# Patient Record
Sex: FEMALE | Race: ASIAN | NOT HISPANIC OR LATINO | Employment: PART TIME
[De-identification: names, ages, dates, MRNs, and addresses within clinical notes are randomized per-mention and may not be internally consistent; named-entity substitution may affect disease eponyms.]

---

## 2017-04-15 ENCOUNTER — RECORDS - HEALTHEAST (OUTPATIENT)
Dept: ADMINISTRATIVE | Facility: OTHER | Age: 62
End: 2017-04-15

## 2017-04-18 ENCOUNTER — OFFICE VISIT - HEALTHEAST (OUTPATIENT)
Dept: FAMILY MEDICINE | Facility: CLINIC | Age: 62
End: 2017-04-18

## 2017-04-18 DIAGNOSIS — M62.830 BACK SPASM: ICD-10-CM

## 2017-04-18 DIAGNOSIS — E78.5 HYPERLIPIDEMIA: ICD-10-CM

## 2017-04-18 DIAGNOSIS — N39.0 UTI (URINARY TRACT INFECTION): ICD-10-CM

## 2017-04-21 ENCOUNTER — RECORDS - HEALTHEAST (OUTPATIENT)
Dept: ADMINISTRATIVE | Facility: OTHER | Age: 62
End: 2017-04-21

## 2017-04-24 ENCOUNTER — OFFICE VISIT - HEALTHEAST (OUTPATIENT)
Dept: FAMILY MEDICINE | Facility: CLINIC | Age: 62
End: 2017-04-24

## 2017-04-24 DIAGNOSIS — J40 BRONCHITIS: ICD-10-CM

## 2017-04-24 DIAGNOSIS — I10 ESSENTIAL HYPERTENSION WITH GOAL BLOOD PRESSURE LESS THAN 140/90: ICD-10-CM

## 2017-04-24 DIAGNOSIS — E78.5 HYPERLIPIDEMIA: ICD-10-CM

## 2017-04-24 DIAGNOSIS — H40.9: ICD-10-CM

## 2017-06-30 ENCOUNTER — COMMUNICATION - HEALTHEAST (OUTPATIENT)
Dept: ADMINISTRATIVE | Facility: CLINIC | Age: 62
End: 2017-06-30

## 2017-12-06 ENCOUNTER — OFFICE VISIT - HEALTHEAST (OUTPATIENT)
Dept: FAMILY MEDICINE | Facility: CLINIC | Age: 62
End: 2017-12-06

## 2017-12-06 DIAGNOSIS — B96.89 BACTERIAL SINUSITIS: ICD-10-CM

## 2017-12-06 DIAGNOSIS — R09.81 SINUS CONGESTION: ICD-10-CM

## 2017-12-06 DIAGNOSIS — J32.9 BACTERIAL SINUSITIS: ICD-10-CM

## 2017-12-06 DIAGNOSIS — R05.9 COUGH: ICD-10-CM

## 2017-12-06 DIAGNOSIS — E66.3 OVERWEIGHT (BMI 25.0-29.9): ICD-10-CM

## 2017-12-06 ASSESSMENT — MIFFLIN-ST. JEOR: SCORE: 1019.89

## 2018-01-22 ENCOUNTER — OFFICE VISIT - HEALTHEAST (OUTPATIENT)
Dept: FAMILY MEDICINE | Facility: CLINIC | Age: 63
End: 2018-01-22

## 2018-01-22 DIAGNOSIS — R73.9 HYPERGLYCEMIA: ICD-10-CM

## 2018-01-22 DIAGNOSIS — E78.5 HYPERLIPIDEMIA, UNSPECIFIED HYPERLIPIDEMIA TYPE: ICD-10-CM

## 2018-01-22 DIAGNOSIS — I10 ESSENTIAL HYPERTENSION: ICD-10-CM

## 2018-01-22 DIAGNOSIS — R35.0 URINARY FREQUENCY: ICD-10-CM

## 2018-01-22 LAB
ALBUMIN UR-MCNC: NEGATIVE MG/DL
APPEARANCE UR: CLEAR
BILIRUB UR QL STRIP: NEGATIVE
COLOR UR AUTO: YELLOW
GLUCOSE UR STRIP-MCNC: NEGATIVE MG/DL
HBA1C MFR BLD: 6.2 % (ref 3.5–6)
HGB UR QL STRIP: NEGATIVE
KETONES UR STRIP-MCNC: NEGATIVE MG/DL
LEUKOCYTE ESTERASE UR QL STRIP: ABNORMAL
NITRATE UR QL: NEGATIVE
PH UR STRIP: 6.5 [PH] (ref 5–8)
SP GR UR STRIP: 1.02 (ref 1–1.03)
UROBILINOGEN UR STRIP-ACNC: ABNORMAL

## 2018-01-23 LAB
ANION GAP SERPL CALCULATED.3IONS-SCNC: 6 MMOL/L (ref 5–18)
BUN SERPL-MCNC: 18 MG/DL (ref 8–22)
CALCIUM SERPL-MCNC: 9.4 MG/DL (ref 8.5–10.5)
CHLORIDE BLD-SCNC: 104 MMOL/L (ref 98–107)
CHOLEST SERPL-MCNC: 269 MG/DL
CO2 SERPL-SCNC: 29 MMOL/L (ref 22–31)
CREAT SERPL-MCNC: 0.72 MG/DL (ref 0.6–1.1)
FASTING STATUS PATIENT QL REPORTED: YES
GFR SERPL CREATININE-BSD FRML MDRD: >60 ML/MIN/1.73M2
GLUCOSE BLD-MCNC: 107 MG/DL (ref 70–125)
HDLC SERPL-MCNC: 86 MG/DL
LDLC SERPL CALC-MCNC: 153 MG/DL
POTASSIUM BLD-SCNC: 4.1 MMOL/L (ref 3.5–5)
SODIUM SERPL-SCNC: 139 MMOL/L (ref 136–145)
TRIGL SERPL-MCNC: 152 MG/DL

## 2018-01-24 LAB — BACTERIA SPEC CULT: NO GROWTH

## 2018-01-25 ENCOUNTER — COMMUNICATION - HEALTHEAST (OUTPATIENT)
Dept: FAMILY MEDICINE | Facility: CLINIC | Age: 63
End: 2018-01-25

## 2018-08-14 ENCOUNTER — OFFICE VISIT - HEALTHEAST (OUTPATIENT)
Dept: FAMILY MEDICINE | Facility: CLINIC | Age: 63
End: 2018-08-14
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
## 2018-08-14 ASSESSMENT — MIFFLIN-ST. JEOR: SCORE: 1046.26

## 2019-04-26 ENCOUNTER — OFFICE VISIT - HEALTHEAST (OUTPATIENT)
Dept: FAMILY MEDICINE | Facility: CLINIC | Age: 64
End: 2019-04-26

## 2019-04-26 DIAGNOSIS — E78.5 HYPERLIPIDEMIA, UNSPECIFIED HYPERLIPIDEMIA TYPE: ICD-10-CM

## 2019-04-26 DIAGNOSIS — R06.02 SOB (SHORTNESS OF BREATH) ON EXERTION: ICD-10-CM

## 2019-04-26 DIAGNOSIS — R07.89 ATYPICAL CHEST PAIN: ICD-10-CM

## 2019-04-26 DIAGNOSIS — Z79.2 PROPHYLACTIC ANTIBIOTIC: ICD-10-CM

## 2019-04-26 DIAGNOSIS — Z12.11 SCREEN FOR COLON CANCER: ICD-10-CM

## 2019-04-26 DIAGNOSIS — B00.9 HERPES SIMPLEX: ICD-10-CM

## 2019-04-26 DIAGNOSIS — N95.2 ATROPHIC VAGINITIS: ICD-10-CM

## 2019-04-26 DIAGNOSIS — J18.9 PNEUMONIA DUE TO INFECTIOUS ORGANISM, UNSPECIFIED LATERALITY, UNSPECIFIED PART OF LUNG: ICD-10-CM

## 2019-04-26 DIAGNOSIS — R73.03 PREDIABETES: ICD-10-CM

## 2019-04-26 DIAGNOSIS — R05.9 COUGH: ICD-10-CM

## 2019-04-26 DIAGNOSIS — Z12.31 VISIT FOR SCREENING MAMMOGRAM: ICD-10-CM

## 2019-04-26 LAB
ALBUMIN SERPL-MCNC: 4.2 G/DL (ref 3.5–5)
ALP SERPL-CCNC: 73 U/L (ref 45–120)
ALT SERPL W P-5'-P-CCNC: 26 U/L (ref 0–45)
ANION GAP SERPL CALCULATED.3IONS-SCNC: 8 MMOL/L (ref 5–18)
AST SERPL W P-5'-P-CCNC: 24 U/L (ref 0–40)
BASOPHILS # BLD AUTO: 0 THOU/UL (ref 0–0.2)
BASOPHILS NFR BLD AUTO: 1 % (ref 0–2)
BILIRUB SERPL-MCNC: 0.8 MG/DL (ref 0–1)
BUN SERPL-MCNC: 15 MG/DL (ref 8–22)
CALCIUM SERPL-MCNC: 10 MG/DL (ref 8.5–10.5)
CHLORIDE BLD-SCNC: 103 MMOL/L (ref 98–107)
CHOLEST SERPL-MCNC: 251 MG/DL
CO2 SERPL-SCNC: 30 MMOL/L (ref 22–31)
CREAT SERPL-MCNC: 0.75 MG/DL (ref 0.6–1.1)
EOSINOPHIL # BLD AUTO: 0.1 THOU/UL (ref 0–0.4)
EOSINOPHIL NFR BLD AUTO: 3 % (ref 0–6)
ERYTHROCYTE [DISTWIDTH] IN BLOOD BY AUTOMATED COUNT: 12 % (ref 11–14.5)
FASTING STATUS PATIENT QL REPORTED: ABNORMAL
GFR SERPL CREATININE-BSD FRML MDRD: >60 ML/MIN/1.73M2
GLUCOSE BLD-MCNC: 101 MG/DL (ref 70–125)
HBA1C MFR BLD: 6.2 % (ref 3.5–6)
HCT VFR BLD AUTO: 40.4 % (ref 35–47)
HDLC SERPL-MCNC: 85 MG/DL
HGB BLD-MCNC: 13.6 G/DL (ref 12–16)
LDLC SERPL CALC-MCNC: 137 MG/DL
LYMPHOCYTES # BLD AUTO: 2 THOU/UL (ref 0.8–4.4)
LYMPHOCYTES NFR BLD AUTO: 40 % (ref 20–40)
MCH RBC QN AUTO: 32.5 PG (ref 27–34)
MCHC RBC AUTO-ENTMCNC: 33.6 G/DL (ref 32–36)
MCV RBC AUTO: 97 FL (ref 80–100)
MONOCYTES # BLD AUTO: 0.3 THOU/UL (ref 0–0.9)
MONOCYTES NFR BLD AUTO: 6 % (ref 2–10)
NEUTROPHILS # BLD AUTO: 2.6 THOU/UL (ref 2–7.7)
NEUTROPHILS NFR BLD AUTO: 51 % (ref 50–70)
PLATELET # BLD AUTO: 377 THOU/UL (ref 140–440)
PMV BLD AUTO: 6.7 FL (ref 7–10)
POTASSIUM BLD-SCNC: 4 MMOL/L (ref 3.5–5)
PROT SERPL-MCNC: 7.4 G/DL (ref 6–8)
RBC # BLD AUTO: 4.17 MILL/UL (ref 3.8–5.4)
SODIUM SERPL-SCNC: 141 MMOL/L (ref 136–145)
TRIGL SERPL-MCNC: 147 MG/DL
WBC: 5.1 THOU/UL (ref 4–11)

## 2019-04-28 ENCOUNTER — COMMUNICATION - HEALTHEAST (OUTPATIENT)
Dept: FAMILY MEDICINE | Facility: CLINIC | Age: 64
End: 2019-04-28

## 2019-05-20 ENCOUNTER — COMMUNICATION - HEALTHEAST (OUTPATIENT)
Dept: ADMINISTRATIVE | Facility: CLINIC | Age: 64
End: 2019-05-20

## 2019-06-24 ENCOUNTER — COMMUNICATION - HEALTHEAST (OUTPATIENT)
Dept: FAMILY MEDICINE | Facility: CLINIC | Age: 64
End: 2019-06-24

## 2019-11-05 ENCOUNTER — RECORDS - HEALTHEAST (OUTPATIENT)
Dept: ADMINISTRATIVE | Facility: OTHER | Age: 64
End: 2019-11-05

## 2019-11-13 ENCOUNTER — RECORDS - HEALTHEAST (OUTPATIENT)
Dept: ADMINISTRATIVE | Facility: OTHER | Age: 64
End: 2019-11-13

## 2019-11-29 ENCOUNTER — OFFICE VISIT - HEALTHEAST (OUTPATIENT)
Dept: FAMILY MEDICINE | Facility: CLINIC | Age: 64
End: 2019-11-29

## 2019-11-29 DIAGNOSIS — J40 BRONCHITIS: ICD-10-CM

## 2020-01-09 ENCOUNTER — OFFICE VISIT - HEALTHEAST (OUTPATIENT)
Dept: FAMILY MEDICINE | Facility: CLINIC | Age: 65
End: 2020-01-09

## 2020-01-09 ENCOUNTER — COMMUNICATION - HEALTHEAST (OUTPATIENT)
Dept: TELEHEALTH | Facility: CLINIC | Age: 65
End: 2020-01-09

## 2020-01-09 DIAGNOSIS — J40 BRONCHITIS: ICD-10-CM

## 2020-01-09 DIAGNOSIS — R05.9 COUGH: ICD-10-CM

## 2020-01-09 ASSESSMENT — MIFFLIN-ST. JEOR: SCORE: 1044.39

## 2020-01-13 ENCOUNTER — OFFICE VISIT - HEALTHEAST (OUTPATIENT)
Dept: FAMILY MEDICINE | Facility: CLINIC | Age: 65
End: 2020-01-13

## 2020-01-13 DIAGNOSIS — J06.9 UPPER RESPIRATORY TRACT INFECTION, UNSPECIFIED TYPE: ICD-10-CM

## 2020-01-13 DIAGNOSIS — J11.1 INFLUENZA: ICD-10-CM

## 2020-02-27 ENCOUNTER — OFFICE VISIT - HEALTHEAST (OUTPATIENT)
Dept: FAMILY MEDICINE | Facility: CLINIC | Age: 65
End: 2020-02-27

## 2020-02-27 DIAGNOSIS — M79.671 RIGHT FOOT PAIN: ICD-10-CM

## 2020-03-04 ENCOUNTER — OFFICE VISIT - HEALTHEAST (OUTPATIENT)
Dept: FAMILY MEDICINE | Facility: CLINIC | Age: 65
End: 2020-03-04

## 2020-03-04 DIAGNOSIS — J11.1 INFLUENZA: ICD-10-CM

## 2020-09-02 ENCOUNTER — AMBULATORY - HEALTHEAST (OUTPATIENT)
Dept: NURSING | Facility: CLINIC | Age: 65
End: 2020-09-02

## 2020-09-02 DIAGNOSIS — S81.819A LACERATION OF LOWER LEG: ICD-10-CM

## 2021-03-17 ENCOUNTER — AMBULATORY - HEALTHEAST (OUTPATIENT)
Dept: NURSING | Facility: CLINIC | Age: 66
End: 2021-03-17

## 2021-04-07 ENCOUNTER — AMBULATORY - HEALTHEAST (OUTPATIENT)
Dept: NURSING | Facility: CLINIC | Age: 66
End: 2021-04-07

## 2021-05-24 ENCOUNTER — HOSPITAL ENCOUNTER (EMERGENCY)
Dept: EMERGENCY MEDICINE | Facility: CLINIC | Age: 66
Discharge: HOME | End: 2021-05-24
Attending: EMERGENCY MEDICINE
Payer: COMMERCIAL

## 2021-05-24 ENCOUNTER — COMMUNICATION - HEALTHEAST (OUTPATIENT)
Dept: UROLOGY | Facility: CLINIC | Age: 66
End: 2021-05-24

## 2021-05-24 DIAGNOSIS — N20.0 NEPHROLITHIASIS: ICD-10-CM

## 2021-05-24 DIAGNOSIS — K59.00 CONSTIPATION, UNSPECIFIED CONSTIPATION TYPE: ICD-10-CM

## 2021-05-24 DIAGNOSIS — N20.1 URETEROLITHIASIS: ICD-10-CM

## 2021-05-24 LAB
ALBUMIN SERPL-MCNC: 3.9 G/DL (ref 3.5–5)
ALP SERPL-CCNC: 80 U/L (ref 45–120)
ALT SERPL W P-5'-P-CCNC: 28 U/L (ref 0–45)
ANION GAP SERPL CALCULATED.3IONS-SCNC: 13 MMOL/L (ref 5–18)
AST SERPL W P-5'-P-CCNC: 20 U/L (ref 0–40)
BASOPHILS # BLD AUTO: 0 THOU/UL (ref 0–0.2)
BASOPHILS NFR BLD AUTO: 0 % (ref 0–2)
BILIRUB DIRECT SERPL-MCNC: 0.2 MG/DL
BILIRUB SERPL-MCNC: 0.9 MG/DL (ref 0–1)
BUN SERPL-MCNC: 16 MG/DL (ref 8–22)
CALCIUM SERPL-MCNC: 9.2 MG/DL (ref 8.5–10.5)
CHLORIDE BLD-SCNC: 100 MMOL/L (ref 98–107)
CO2 SERPL-SCNC: 22 MMOL/L (ref 22–31)
CREAT SERPL-MCNC: 1.27 MG/DL (ref 0.6–1.1)
EOSINOPHIL # BLD AUTO: 0 THOU/UL (ref 0–0.4)
EOSINOPHIL NFR BLD AUTO: 0 % (ref 0–6)
ERYTHROCYTE [DISTWIDTH] IN BLOOD BY AUTOMATED COUNT: 11.5 % (ref 11–14.5)
GFR SERPL CREATININE-BSD FRML MDRD: 42 ML/MIN/1.73M2
GLUCOSE BLD-MCNC: 165 MG/DL (ref 70–125)
HCT VFR BLD AUTO: 40.1 % (ref 35–47)
HGB BLD-MCNC: 13.9 G/DL (ref 12–16)
IMM GRANULOCYTES # BLD: 0.1 THOU/UL
IMM GRANULOCYTES NFR BLD: 1 %
LACTATE SERPL-SCNC: 1.8 MMOL/L (ref 0.7–2)
LACTATE SERPL-SCNC: 2.9 MMOL/L (ref 0.7–2)
LIPASE SERPL-CCNC: <9 U/L (ref 0–52)
LYMPHOCYTES # BLD AUTO: 1.3 THOU/UL (ref 0.8–4.4)
LYMPHOCYTES NFR BLD AUTO: 11 % (ref 20–40)
MCH RBC QN AUTO: 31.7 PG (ref 27–34)
MCHC RBC AUTO-ENTMCNC: 34.7 G/DL (ref 32–36)
MCV RBC AUTO: 91 FL (ref 80–100)
MONOCYTES # BLD AUTO: 0.8 THOU/UL (ref 0–0.9)
MONOCYTES NFR BLD AUTO: 6 % (ref 2–10)
NEUTROPHILS # BLD AUTO: 9.9 THOU/UL (ref 2–7.7)
NEUTROPHILS NFR BLD AUTO: 83 % (ref 50–70)
PLATELET # BLD AUTO: 327 THOU/UL (ref 140–440)
PMV BLD AUTO: 8.5 FL (ref 8.5–12.5)
POTASSIUM BLD-SCNC: 3.9 MMOL/L (ref 3.5–5)
PROT SERPL-MCNC: 7.8 G/DL (ref 6–8)
RBC # BLD AUTO: 4.39 MILL/UL (ref 3.8–5.4)
SODIUM SERPL-SCNC: 135 MMOL/L (ref 136–145)
WBC: 11.9 THOU/UL (ref 4–11)

## 2021-05-25 ENCOUNTER — OFFICE VISIT - HEALTHEAST (OUTPATIENT)
Dept: UROLOGY | Facility: CLINIC | Age: 66
End: 2021-05-25

## 2021-05-25 ENCOUNTER — RECORDS - HEALTHEAST (OUTPATIENT)
Dept: UROLOGY | Facility: CLINIC | Age: 66
End: 2021-05-25

## 2021-05-25 DIAGNOSIS — N13.2 HYDRONEPHROSIS WITH URINARY OBSTRUCTION DUE TO URETERAL CALCULUS: ICD-10-CM

## 2021-05-25 DIAGNOSIS — N20.0 CALCULUS OF KIDNEY: ICD-10-CM

## 2021-05-25 DIAGNOSIS — N20.1 CALCULUS OF URETER: ICD-10-CM

## 2021-05-25 LAB — BACTERIA SPEC CULT: NO GROWTH

## 2021-05-27 ENCOUNTER — COMMUNICATION - HEALTHEAST (OUTPATIENT)
Dept: UROLOGY | Facility: CLINIC | Age: 66
End: 2021-05-27

## 2021-05-28 ENCOUNTER — COMMUNICATION - HEALTHEAST (OUTPATIENT)
Dept: UROLOGY | Facility: CLINIC | Age: 66
End: 2021-05-28

## 2021-05-28 DIAGNOSIS — N20.1 URETEROLITHIASIS: ICD-10-CM

## 2021-05-28 NOTE — PROGRESS NOTES
SUBJECTIVE: Gala Nicholas is a 63 y.o.  female who presents today with a complaint of 3 weeks of cough and chest tightness.  She said mostly it is a croupy dry cough.  At times she gets dizzy with it.  She is not congested as she has been using a [place] pot.  She does not think it feels like bronchitis.  Last Tuesday she started with some nausea and vomiting.  She had headache and body aches and wheeze.  She also admits that she has had intermittent chest pressure/tightness which is worse with exertion in the last 2 weeks.  This is worrisome as when I saw her in late January and she had lab work done, it showed that she has pretty high cholesterol at 269 with an LDL of 153.  She also was found to be prediabetic with an A1c of 6.2%.  She has family history for this as her mom has diabetes even though she is not overweight in the least.  We discussed whether she should consider doing and a cardiac stress test.  We discussed that this should probably not be done until she is feeling better as she will not be able to fulfill it the way it should be done.  I suggested doing a chest x-ray today and some lab work including a hemogram that we can follow if need be.  She also is in need of her colonoscopy and mammogram which she is somewhat resistant to doing.  She does want refills of some of her medications.    OBJECTIVE: /82 (Patient Site: Left Arm, Patient Position: Sitting, Cuff Size: Adult Regular)   Pulse 73   Temp 98.2  F (36.8  C) (Oral)   Wt 129 lb 11.2 oz (58.8 kg)   LMP  (Exact Date)   SpO2 99%   BMI 26.20 kg/m    General: Relatively well-appearing older  female with intermittent somewhat harsh nonproductive cough  HEENT: Eyes clear, nose without rhinorrhea  Heart: Regular rate and rhythm without murmur  Lungs: Left lung noise that cleared after cough, upper airway noise  Abdomen: Soft  Extremities: Warm, dry and without edema    ASSESSMENT & PLAN:    1. Cough     2. Pneumonia due to  infectious organism, unspecified laterality, unspecified part of lung  azithromycin (ZITHROMAX Z-CARLYN) 250 MG tablet   3. SOB (shortness of breath) on exertion  XR Chest 2 Views    HM1(CBC and Differential)    HM1 (CBC with Diff)   4. Atypical chest pain  XR Chest 2 Views    NM Exercise Stress Test   5. Hyperlipidemia, unspecified hyperlipidemia type  Lipid Cascade    Comprehensive Metabolic Panel   6. Prediabetes  Glycosylated Hemoglobin A1c   7. Herpes simplex  valACYclovir (VALTREX) 500 MG tablet    acyclovir (ZOVIRAX) 5 % ointment    DISCONTINUED: acyclovir (ZOVIRAX) 5 % ointment   8. Atrophic vaginitis  estradiol (ESTRACE) 0.01 % (0.1 mg/gram) vaginal cream    DISCONTINUED: estradiol (ESTRACE) 0.01 % (0.1 mg/gram) vaginal cream   9. Prophylactic antibiotic  nitrofurantoin (MACRODANTIN) 50 MG capsule   10. Visit for screening mammogram  Mammo Screening Bilateral   11. Screen for colon cancer  Ambulatory referral for Colonoscopy     I will do a chest x-ray and lab work as above and I will get back to her on those results by mail and only call with grossly abnormal values and I will call with chest x-ray result and hemogram.  I think it is reasonable to treat her with an antibiotic as she has had this cough for 3 weeks.  I will treat her with a Z-Carlyn.  I am refilling her cold sore medication, hormone replacement cream and nitrofurantoin which she uses postcoitally.  And mammogram and colonoscopy were ordered.  If the chest x-ray shows pneumonia she will need to follow-up in a week or 2.  I have ordered a nuclear medicine exercise stress test for her symptoms of chest tightness, worse with exertion.  I do believe she is at risk for coronary artery disease with her risk factors.  I want her to do the stress test out 2 weeks from now at minimum.  If the stress test is normal and if she gets better with treatment and does not appear to have lobar pneumonia on chest x-ray then she can see me back in 6 months  time.    Patient Active Problem List   Diagnosis     Hand arthritis     Hyperlipidemia     Murmur, cardiac     Genital warts     Herpes simplex     Postcoital urinary tract infection     Impaired fasting glucose     Glaucoma     Essential hypertension     Prediabetes       Current Outpatient Medications on File Prior to Visit   Medication Sig Dispense Refill     aspirin 325 MG EC tablet Take 325 mg by mouth as needed.              aspirin 81 MG EC tablet Take 81 mg by mouth daily.       BIOTIN ORAL Take 1 tablet by mouth daily.              cholecalciferol, vitamin D3, 5,000 unit Tab Take 5,000 Units by mouth as needed.              EVENING PRIMROSE OIL ORAL Take 1 tablet by mouth 2 (two) times a day.              FA/mv,Ca,iron,min/lycopene/lut (MULTIVITAL ORAL) Take 1 tablet by mouth daily.       ibuprofen (ADVIL,MOTRIN) 200 MG tablet Take 200 mg by mouth every 6 (six) hours as needed for pain.       OMEGA-3/DHA/EPA/FISH OIL (FISH OIL-OMEGA-3 FATTY ACIDS) 300-1,000 mg capsule Take 2 g by mouth daily.       RED YEAST RICE ORAL Take 1 tablet by mouth as needed.       RESTASIS 0.05 % ophthalmic emulsion INSTILL 1 DROP INTO BOTH EYES TWICE A DAY  11     timolol (BETIMOL) 0.5 % ophthalmic solution 1 drop 2 (two) times a day.       UNABLE TO FIND Med Name: Amberen 1300 mg Take 2 capsules by mouth daily       benzonatate (TESSALON) 200 MG capsule Take 1 capsule (200 mg total) by mouth 3 (three) times a day as needed for cough. 30 capsule 0     No current facility-administered medications on file prior to visit.

## 2021-05-29 NOTE — TELEPHONE ENCOUNTER
I called Gala Nicholas to remind her that she is due for a mammogram. She said that she has had her previous mammograms done at [facility] in [place] and will call there to schedule an appointment.   She said that she will have the report and previous reports sent to the clinic.  Radha WHITT CMA

## 2021-05-30 VITALS — BODY MASS INDEX: 27.3 KG/M2 | WEIGHT: 132.9 LBS

## 2021-05-30 VITALS — WEIGHT: 131.1 LBS | BODY MASS INDEX: 26.93 KG/M2

## 2021-05-31 VITALS — WEIGHT: 130.5 LBS | BODY MASS INDEX: 27.27 KG/M2

## 2021-05-31 VITALS — WEIGHT: 129 LBS | HEIGHT: 58 IN | BODY MASS INDEX: 27.08 KG/M2

## 2021-06-01 VITALS — HEIGHT: 59 IN | WEIGHT: 131.31 LBS | BODY MASS INDEX: 26.47 KG/M2

## 2021-06-03 VITALS — WEIGHT: 129.7 LBS | BODY MASS INDEX: 26.2 KG/M2

## 2021-06-03 NOTE — PROGRESS NOTES
Chief Complaint   Patient presents with     Cough     Pt c/o sore throat, HA, runny nose, sinus congestion, coughed up bloody yellow phlegm, bronchioles feel burning, aches and chills       HPI: Very pleasant 64-year-old female with past history of hyperlipidemia and prediabetes who works for [employer], presents today with a 8-day history of cough, congestion, sinus pain.  She has a past history of facial pain as well as bronchitis.  What is new and different is she is coughing up green sputum with occasional slight specks of blood.  This is common for her when she gets bronchitis.  She has been using a [place] pot as well.  In addition, her cough is causing her great deal of difficulty sleeping and is quite annoying.  She would like medication for this.    ROS: No fever vomiting or diarrhea or rashes    SH:    reports that she quit smoking about 35 years ago. She has never used smokeless tobacco. She reports current alcohol use of about 0.8 standard drinks of alcohol per week. She reports that she does not use drugs.      FH: The Patient's family history includes Atrial fibrillation in her brother and paternal grandfather; Cancer in her father; Diabetes in her mother; Heart disease in her paternal grandfather; Hypertension in her father and maternal grandmother; Stroke in her paternal grandmother.     Meds:  Gala has a current medication list which includes the following prescription(s): acyclovir, aspirin, aspirin, azithromycin, biotin, cholecalciferol (vitamin d3), estradiol, evening primrose oil, fa/mv,ca,iron,min/lycopene/lut, ibuprofen, nitrofurantoin, fish oil-omega-3 fatty acids, red yeast rice, restasis, timolol, UNABLE TO FIND, azithromycin, and codeine-guaifenesin.    O:  /70   Pulse (!) 106   Temp 99.7  F (37.6  C)   Resp 16   Wt 133 lb 4 oz (60.4 kg)   SpO2 98%   BMI 26.91 kg/m     Constitutional:    --Vitals as above  --No acute distress  Eyes-  --Sclera noninjected  --Lids and conjunctiva  normal  ENT-  --TMs clear  --Sclera noninjected  --Pharynx moderately erythematous  --Pain to palpation over the maxillary sinuses bilaterally  Neck-  --Neck supple    Lymph-  --No cervical lymphadenopathy  Lungs-  --Clear to Auscultation  Heart-  --Regular rate and rhythm  Skin-  --Pink and dry          A/P:   1. Bronchitis  Patient has a history of bronchitis and now has green sputum consistent with her past history.  Due to her comorbidities will treat with Zithromax, increase fluids and rest.  - azithromycin (ZITHROMAX Z-CARLYN) 250 MG tablet; 2 tabs today then 1 daily x 4 days  Dispense: 6 tablet; Refill: 0      2.  Cough  - codeine-guaiFENesin (GUAIFENESIN AC)  mg/5 mL liquid; 2 TSP Q 6H PRN cough-advise pt not to drive while taking medication.  Dispense: 120 mL; Refill: 0

## 2021-06-04 VITALS
WEIGHT: 129.6 LBS | HEART RATE: 79 BPM | SYSTOLIC BLOOD PRESSURE: 130 MMHG | DIASTOLIC BLOOD PRESSURE: 79 MMHG | OXYGEN SATURATION: 98 % | BODY MASS INDEX: 26.18 KG/M2 | TEMPERATURE: 98.1 F

## 2021-06-04 VITALS
HEART RATE: 89 BPM | OXYGEN SATURATION: 98 % | BODY MASS INDEX: 26.05 KG/M2 | RESPIRATION RATE: 16 BRPM | WEIGHT: 129 LBS | SYSTOLIC BLOOD PRESSURE: 138 MMHG | DIASTOLIC BLOOD PRESSURE: 84 MMHG

## 2021-06-04 VITALS
HEIGHT: 59 IN | RESPIRATION RATE: 20 BRPM | TEMPERATURE: 98.2 F | SYSTOLIC BLOOD PRESSURE: 124 MMHG | HEART RATE: 78 BPM | DIASTOLIC BLOOD PRESSURE: 80 MMHG | WEIGHT: 130.9 LBS | BODY MASS INDEX: 26.39 KG/M2

## 2021-06-04 VITALS
HEART RATE: 83 BPM | OXYGEN SATURATION: 98 % | RESPIRATION RATE: 16 BRPM | DIASTOLIC BLOOD PRESSURE: 80 MMHG | TEMPERATURE: 99 F | WEIGHT: 129 LBS | BODY MASS INDEX: 26.05 KG/M2 | SYSTOLIC BLOOD PRESSURE: 130 MMHG

## 2021-06-04 VITALS
RESPIRATION RATE: 16 BRPM | WEIGHT: 133.25 LBS | DIASTOLIC BLOOD PRESSURE: 70 MMHG | TEMPERATURE: 99.7 F | OXYGEN SATURATION: 98 % | HEART RATE: 106 BPM | BODY MASS INDEX: 26.91 KG/M2 | SYSTOLIC BLOOD PRESSURE: 120 MMHG

## 2021-06-05 NOTE — PROGRESS NOTES
"Chief Complaint   Patient presents with     Cough     started, saturday, getting worse, runny nose, similar to what she was seen for 11/29/2019       HPI: This very pleasant 64-year-old [employer] worker presents today with sore throat cough sputum production and a deep croupy cough.  She has been up at night and having difficulty sleeping.  She works at [employer] and has had a hectic schedule over the [place] time.  She notes that she has not been able to get much sleep and she has been working a much more physical job.  This is in the context of her  also being sick.    ROS: No fever no vomiting.  Positive sputum    SH:    reports that she quit smoking about 35 years ago. She has never used smokeless tobacco. She reports current alcohol use of about 0.8 standard drinks of alcohol per week. She reports that she does not use drugs.      FH: The Patient's family history includes Atrial fibrillation in her brother and paternal grandfather; Cancer in her father; Diabetes in her mother; Heart disease in her paternal grandfather; Hypertension in her father and maternal grandmother; Stroke in her paternal grandmother.     Meds: Gala has a current medication list which includes the following prescription(s): aspirin, aspirin, biotin, cholecalciferol (vitamin d3), estradiol, evening primrose oil, fa/mv,ca,iron,min/lycopene/lut, ibuprofen, nitrofurantoin, fish oil-omega-3 fatty acids, red yeast rice, timolol, UNABLE TO FIND, acyclovir, amoxicillin-clavulanate, azithromycin, azithromycin, codeine-guaifenesin, codeine-guaifenesin, and restasis.    O:  /80   Pulse 78   Temp 98.2  F (36.8  C) (Oral)   Resp 20   Ht 4' 11\" (1.499 m)   Wt 130 lb 14.4 oz (59.4 kg)   BMI 26.44 kg/m    Constitutional:    --Vitals as above  --No acute distress  Eyes-  --Sclera noninjected  --Lids and conjunctiva normal  ENT-  --TMs clear  --Sclera noninjected  --Pharynx moderately erythematous  Neck-  --Neck supple    Lymph-  --mild cervical " lymphadenopathy  Lungs-  --Clear to Auscultation  Heart-  --Regular rate and rhythm  Skin-  --Pink and dry    A/P:   1. Bronchitis  The patient has mild to moderate bronchitis.  In addition I think she is run down.  Encourage increased fluids, rest, and will give Augmentin.  - amoxicillin-clavulanate (AUGMENTIN) 875-125 mg per tablet; Take 1 tablet by mouth 2 (two) times a day for 10 days. Take with food  Dispense: 20 tablet; Refill: 0    2. Cough  The patient has difficulty sleeping due to the cough and so we will dispense codeine cough syrup and she was warned about driving.  - codeine-guaiFENesin (GUAIFENESIN AC)  mg/5 mL liquid; 2 TSP Q 6H PRN cough-advise pt not to drive while taking medication.  Dispense: 120 mL; Refill: 0

## 2021-06-05 NOTE — PROGRESS NOTES
Chief Complaint   Patient presents with     Headache     cold, chills, coughing up flem very concentrated, body aches, fevers        HPI: Complex 64-year-old female presents today with worsening cough congestion headache body ache and chills.  She notes that after she saw me, the next day, she developed the symptoms particularly focusing on the body aches.  She has been taking Augmentin with no complications.  She has not been able to go to work.    ROS: No vomiting no diarrhea no rashes.    SH:    reports that she quit smoking about 35 years ago. She has never used smokeless tobacco. She reports current alcohol use of about 0.8 standard drinks of alcohol per week. She reports that she does not use drugs.      FH: The Patient's family history includes Atrial fibrillation in her brother and paternal grandfather; Cancer in her father; Diabetes in her mother; Heart disease in her paternal grandfather; Hypertension in her father and maternal grandmother; Stroke in her paternal grandmother.     Meds: Gala has a current medication list which includes the following prescription(s): acyclovir, amoxicillin-clavulanate, aspirin, aspirin, azithromycin, azithromycin, biotin, cholecalciferol (vitamin d3), codeine-guaifenesin, codeine-guaifenesin, estradiol, evening primrose oil, fa/mv,ca,iron,min/lycopene/lut, ibuprofen, nitrofurantoin, fish oil-omega-3 fatty acids, red yeast rice, restasis, timolol, UNABLE TO FIND, azithromycin, and oseltamivir.    O:  /79   Pulse 79   Temp 98.1  F (36.7  C)   Wt 129 lb 9.6 oz (58.8 kg)   SpO2 98%   BMI 26.18 kg/m      Constitutional:    --Vitals as above  --No acute distress but she appears ill with cough and congestion  Eyes-  --Sclera noninjected  --Lids and conjunctiva normal  ENT-  --TMs clear  --Sclera noninjected  --Pharynx moderately erythematous  Neck-  --Neck supple    Lymph-  --No cervical lymphadenopathy  Lungs-  --Clear to Auscultation  Heart-  --Regular rate and  rhythm  Skin-  --Pink and dry    A/P:   1. Upper respiratory tract infection, unspecified type  The patient has upper respiratory infection and she is convinced that Zithromax is worked better for her in the past.  She would like to discontinue Augmentin and do Zithromax.  Given the fact that she most likely has both a viral and bacterial infection would consider changing to Zithromax.  - azithromycin (ZITHROMAX Z-CARLYN) 250 MG tablet; 2 tabs today then 1 daily x 4 days  Dispense: 6 tablet; Refill: 0    2. Influenza  We will start Tamiflu although I told her that it most likely will be minimally effective given the fact it has been approximately 3 days of full symptoms.  Nonetheless she is strongly desirous of starting this medication.  Recommended rest.  - oseltamivir (TAMIFLU) 75 MG capsule; Take 1 capsule (75 mg total) by mouth 2 (two) times a day for 5 days.  Dispense: 10 capsule; Refill: 0      2 work excuse  - Note for excuse from work was written.

## 2021-06-06 NOTE — PROGRESS NOTES
Chief Complaint   Patient presents with     Emesis     Pt c/o sat to sun, she had cramping, throwing up and diarrhea, she had 4 bouts of vomiting, body chills, she felt warm but did not take her temp.      Ear Pain       HPI: 64-year-old female presents today with 3-day history of myalgias fever mild cough.  She also had episode of vomiting and diarrhea on Saturday through Sunday but that resolved quickly.  Significantly, she had been diagnosed with influenza several months ago.  She feels fatigued and has decreased energy.    ROS: Currently no vomiting or diarrhea.  Positive for fever    SH: Reviewed-see Snapshot for review.     FH: Reviewed-see Snapshot for review.    Meds:  Gala has a current medication list which includes the following prescription(s): acyclovir, aspirin, aspirin, azithromycin, azithromycin, azithromycin, biotin, cholecalciferol (vitamin d3), codeine-guaifenesin, codeine-guaifenesin, estradiol, evening primrose oil, fa/mv,ca,iron,min/lycopene/lut, ibuprofen, nitrofurantoin, fish oil-omega-3 fatty acids, red yeast rice, restasis, timolol, UNABLE TO FIND, and oseltamivir.    O:  /80   Pulse 83   Temp 99  F (37.2  C)   Resp 16   Wt 129 lb (58.5 kg)   SpO2 98%   BMI 26.05 kg/m    Constitutional:    --Vitals as above  --No acute distress but appears mildly ill  Eyes-  --Sclera noninjected  --Lids and conjunctiva normal  ENT-  --TMs clear  --Sclera noninjected  --Pharynx mildly erythematous  Neck-  --Neck supple    Lymph-  --No cervical lymphadenopathy  Lungs-  --Clear to Auscultation  Heart-  --Regular rate and rhythm  Skin-  --Pink and dry    A/P:   1. Influenza  The patient has symptoms consistent with influenza.  Will treat again with Tamiflu, increase fluids and rest and over-the-counter analgesics.  - oseltamivir (TAMIFLU) 75 MG capsule; Take 1 capsule (75 mg total) by mouth 2 (two) times a day for 5 days.  Dispense: 10 capsule; Refill: 0    2.  Fever  -Ibuprofen as needed for  fever and pain    3.  Encounter for work excuse  - I wrote out a work excuse that she may return to work in the next 2 days if improving.

## 2021-06-06 NOTE — PROGRESS NOTES
"Chief Complaint   Patient presents with     Foot Pain     Pt c/o R foot arch pain x one day, trouble walking up stairs, she could not go to work today.       HPI: Patient presents today for right foot pain that occurred yesterday.  It started yesterday evening.  The only thing that she can think of that may have caused this pain was an extension of her right foot while trying to place a bowl on top shelf yesterday.  She noticed that there is feeling such as active, \"charley horse\" which was excruciatingly painful.  Overnight she used ice and elevation with good effect.  It is improving somewhat today.  It still hurts to walk slightly but better than before.    ROS: No erythema.  No fever.  No color changes.    SH: Reviewed-see Snapshot for review.     FH: Reviewed-see Snapshot for review.    Meds:  Gala has a current medication list which includes the following prescription(s): acyclovir, aspirin, aspirin, azithromycin, azithromycin, azithromycin, biotin, cholecalciferol (vitamin d3), codeine-guaifenesin, codeine-guaifenesin, estradiol, evening primrose oil, fa/mv,ca,iron,min/lycopene/lut, ibuprofen, nitrofurantoin, fish oil-omega-3 fatty acids, red yeast rice, restasis, timolol, and UNABLE TO FIND.    O:  /84   Pulse 89   Resp 16   Wt 129 lb (58.5 kg)   SpO2 98%   BMI 26.05 kg/m    Constitutional:    --Vitals as above  --No acute distress  Eyes-  --Sclera noninjected  --Lids and conjunctiva normal  Musculoskeletal-  --Examination of the right foot shows no swelling noted.  4+ distal pulses.  There is slight erythema and pain on the medial aspect of the foot near the base of the fifth metatarsal.  Skin-  --Pink and dry    A/P:   1. Right foot pain  Most likely represents mild soft tissue injury.  Recommend rest, ibuprofen as needed, and ice.  NSAIDs as needed over-the-counter.    2.  Encounter for work excuse  --Patient notes that she will need to be off of work and paperwork was signed indicating that " she can be off work until March 5, 2020.

## 2021-06-08 ENCOUNTER — OFFICE VISIT - HEALTHEAST (OUTPATIENT)
Dept: FAMILY MEDICINE | Facility: CLINIC | Age: 66
End: 2021-06-08

## 2021-06-08 DIAGNOSIS — Z12.11 SCREEN FOR COLON CANCER: ICD-10-CM

## 2021-06-08 DIAGNOSIS — N20.0 NEPHROLITHIASIS: ICD-10-CM

## 2021-06-08 DIAGNOSIS — E78.5 HYPERLIPIDEMIA, UNSPECIFIED HYPERLIPIDEMIA TYPE: ICD-10-CM

## 2021-06-08 DIAGNOSIS — R73.03 PREDIABETES: ICD-10-CM

## 2021-06-08 DIAGNOSIS — Q62.11 HYDRONEPHROSIS WITH URETEROPELVIC JUNCTION (UPJ) OBSTRUCTION: ICD-10-CM

## 2021-06-08 DIAGNOSIS — I10 ESSENTIAL HYPERTENSION: ICD-10-CM

## 2021-06-08 LAB
ALBUMIN UR-MCNC: NEGATIVE G/DL
ANION GAP SERPL CALCULATED.3IONS-SCNC: 12 MMOL/L (ref 5–18)
APPEARANCE UR: ABNORMAL
BASOPHILS # BLD AUTO: 0 THOU/UL (ref 0–0.2)
BASOPHILS NFR BLD AUTO: 1 % (ref 0–2)
BILIRUB UR QL STRIP: NEGATIVE
BUN SERPL-MCNC: 16 MG/DL (ref 8–22)
CALCIUM SERPL-MCNC: 9.3 MG/DL (ref 8.5–10.5)
CHLORIDE BLD-SCNC: 103 MMOL/L (ref 98–107)
CHOLEST SERPL-MCNC: 282 MG/DL
CO2 SERPL-SCNC: 25 MMOL/L (ref 22–31)
COLOR UR AUTO: YELLOW
CREAT SERPL-MCNC: 0.69 MG/DL (ref 0.6–1.1)
EOSINOPHIL # BLD AUTO: 0.2 THOU/UL (ref 0–0.4)
EOSINOPHIL NFR BLD AUTO: 3 % (ref 0–6)
ERYTHROCYTE [DISTWIDTH] IN BLOOD BY AUTOMATED COUNT: 11.5 % (ref 11–14.5)
FASTING STATUS PATIENT QL REPORTED: NO
GFR SERPL CREATININE-BSD FRML MDRD: >60 ML/MIN/1.73M2
GLUCOSE BLD-MCNC: 100 MG/DL (ref 70–125)
GLUCOSE UR STRIP-MCNC: NEGATIVE MG/DL
HBA1C MFR BLD: 6.2 %
HCT VFR BLD AUTO: 37.1 % (ref 35–47)
HDLC SERPL-MCNC: 69 MG/DL
HGB BLD-MCNC: 12.8 G/DL (ref 12–16)
HGB UR QL STRIP: NEGATIVE
IMM GRANULOCYTES # BLD: 0 THOU/UL
IMM GRANULOCYTES NFR BLD: 0 %
KETONES UR STRIP-MCNC: NEGATIVE MG/DL
LDLC SERPL CALC-MCNC: 168 MG/DL
LEUKOCYTE ESTERASE UR QL STRIP: ABNORMAL
LYMPHOCYTES # BLD AUTO: 2.2 THOU/UL (ref 0.8–4.4)
LYMPHOCYTES NFR BLD AUTO: 38 % (ref 20–40)
MCH RBC QN AUTO: 32.1 PG (ref 27–34)
MCHC RBC AUTO-ENTMCNC: 34.5 G/DL (ref 32–36)
MCV RBC AUTO: 93 FL (ref 80–100)
MONOCYTES # BLD AUTO: 0.5 THOU/UL (ref 0–0.9)
MONOCYTES NFR BLD AUTO: 9 % (ref 2–10)
NEUTROPHILS # BLD AUTO: 3 THOU/UL (ref 2–7.7)
NEUTROPHILS NFR BLD AUTO: 51 % (ref 50–70)
NITRATE UR QL: NEGATIVE
PH UR STRIP: 5.5 [PH] (ref 5–8)
PLATELET # BLD AUTO: 446 THOU/UL (ref 140–440)
PMV BLD AUTO: 8.6 FL (ref 7–10)
POTASSIUM BLD-SCNC: 3.9 MMOL/L (ref 3.5–5)
RBC # BLD AUTO: 3.99 MILL/UL (ref 3.8–5.4)
SODIUM SERPL-SCNC: 140 MMOL/L (ref 136–145)
SP GR UR STRIP: >=1.03 (ref 1–1.03)
TRIGL SERPL-MCNC: 223 MG/DL
UROBILINOGEN UR STRIP-ACNC: ABNORMAL
WBC: 5.9 THOU/UL (ref 4–11)

## 2021-06-08 ASSESSMENT — MIFFLIN-ST. JEOR: SCORE: 1058.45

## 2021-06-10 NOTE — PROGRESS NOTES
SUBJECTIVE: Gala Nicholas is a 61 y.o.  female who presents today with complaints of a productive cough, fatigue, and malaise.  Her  was recently sick with a respiratory illness which required 2 rounds of azithromycin, dulera, and fluticasone.  Gala has come down with similar symptoms.  She has a productive cough getting up yellow, green, and brown chunky phlegm.  Her cough is frequent and has kept her up the past 2 nights despite elevating her bed, salt water gargles, and saline nasal rinses. She complains of copious sinus congestion and drainage.  She denies shortness of breath and wheezing.    Gala mentions that she recently had an eye exam and her open angle glaucoma is worsening to the point where she now has to take latanoprost drops. She brings in a copy of this report to be scanned into her chart.    Patient has a history of hyperlipidemia and mild hypertension.She has been seeing a chiropractor Ventura Sargent in [place] for the past few decades now who recommended that Gala overhaul her diet by avoiding grains, carbs, sugars, starches, etc.in order to help lower her glucose, triglycerides, and lipids.  She questions whether or not it would be acceptable to do this as a trial while holding off on initiating medication.    OBJECTIVE: /78  Pulse (!) 102  Temp 98  F (36.7  C)  Wt 132 lb 14.4 oz (60.3 kg)  BMI 27.3 kg/m2  General: Tired appearing adult female  HEENT: Clear sinus drainage from her nostrils  Heart: S1, S2. RRR.   Lungs: Clear bilaterally. Frequent coughing noted.  Abdomen: Soft. Non-tender  Extremities: no lower extremity edema.        The 10-year ASCVD risk score (Jeff BILLIE Jr, et al., 2013) is: 3.7%    Values used to calculate the score:      Age: 61 years      Sex: Female      Is Non- : No      Diabetic: No      Tobacco smoker: No      Systolic Blood Pressure: 138 mmHg      Is BP treated: No      HDL Cholesterol: 91 mg/dL      Total  Cholesterol: 298 mg/dL    ASSESSMENT & PLAN:    1. Bronchitis  codeine-guaiFENesin (GUAIFENESIN AC)  mg/5 mL liquid    azithromycin (ZITHROMAX Z-CARLYN) 250 MG tablet   2. Glaucoma of left eye     3. Hyperlipidemia     4. Essential hypertension with goal blood pressure less than 140/90       Prescription for azithromycin sent to the pharmacy. Patient informed that it may take time for her symptoms to improve so she starts feeling better.  Caution advised while taking the codeine-cough syrup as it may cause drowsiness and dizziness.  Discussed benefits and risks of uncontrolled glucose and lipids, but noted that her 10 year ASCVD risk is quite low at 2.4%.  Patient will attempt these lifestyle modifications and we will recheck labs in 6 months to determine efficacy.  Discussed lab screening options for food sensitivities. Work note written.    Patient Active Problem List   Diagnosis     Hand arthritis     Hyperlipidemia     Murmur, cardiac     Genital warts     Herpes simplex     Postcoital urinary tract infection     Impaired fasting glucose     Glaucoma     Essential hypertension       Current Outpatient Prescriptions on File Prior to Visit   Medication Sig Dispense Refill     acyclovir (ZOVIRAX) 5 % ointment Apply twice to 5 times daily as needed 15 g 1     aspirin 325 MG EC tablet Take 325 mg by mouth daily.       aspirin 81 MG EC tablet Take 81 mg by mouth daily.       BIOTIN ORAL Take by mouth.       cephalexin (KEFLEX) 500 MG capsule        cholecalciferol, vitamin D3, 5,000 unit Tab Take by mouth.       cyclobenzaprine (FLEXERIL) 10 MG tablet        estradiol (ESTRACE) 0.01 % (0.1 mg/gram) vaginal cream Use sparingly topically twice a week. 42.5 g 3     EVENING PRIMROSE OIL ORAL Take by mouth.       ibuprofen (ADVIL,MOTRIN) 600 MG tablet        MULTIVIT, CA, MIN/FA/SOY ISOFL (ONE-A-DAY MENOPAUSE FORMULA ORAL) Take by mouth.       multivitamin therapeutic (THERAGRAN) tablet Take 1 tablet by mouth daily.        nitrofurantoin (MACRODANTIN) 50 MG capsule Take 1 capsule (50 mg total) by mouth as needed (postcoital). 30 capsule 0     OMEGA-3/DHA/EPA/FISH OIL (FISH OIL-OMEGA-3 FATTY ACIDS) 300-1,000 mg capsule Take 2 g by mouth daily.       No current facility-administered medications on file prior to visit.                Johnny Santiago, Nurse Practitioner Student

## 2021-06-10 NOTE — PROGRESS NOTES
SUBJECTIVE: Gala Nicholas is a 61 y.o.  female who presents today for follow-up of an ugency room appointment for severe back pain.  I do not have any records of this currently to view.  She does bring me some paperwork they gave her.  She tells me that she was not experiencing excruciating back pain and if she coughed she would have shooting pain.  It was quite severe and so she went in for evaluation.  There appears she was having a mildly elevated blood pressure at 139/70.  She was surprised at how low her blood pressure was today and so I rechecked it and got 135/80.  She has been very resistant to treatment for hypertension.  She has had higher readings than this even.  She is resistant to taking any medication.  In the urgency room they also did a check of her urine.  She had trace blood and small amount of leukocyte esterase but it was otherwise normal.  They treated her for urinary tract infection with Keflex 500 mg 4 times a day for 10 days.  She never took this treatment because she was worried for side effects.  She thought it was an excessive amount of medication.  They also treated her with cyclobenzaprine.  She is not using that either.  She instead laid on an ice vague and fell asleep and in the morning the pain was gone.  Her cough is been all right since as well.  We discussed whether or not she had kidney stone which she does not believe she has ever had before.  We discuss her cholesterol which was quite high when I checked it recently.  She is trying to get it down in a natural way.  She is suspicious of medication and Western treatment.  I suggest that we recheck her urine today.  I tell her that she likely did not have a urinary tract infection since her symptoms were never consistent with it and she is not having symptoms currently.    OBJECTIVE: /70  Pulse 100  Wt 131 lb 1.6 oz (59.5 kg)  BMI 26.93 kg/m2  General: Healthy and relatively fit appearing older female looking  younger than age stated  Heart: Regular rate and rhythm without murmur  Lungs: Clear bilaterally  Abdomen: Soft, nontender, no tenderness with percussion of the kidneys  Extremities: Warm, dry and without edema    UA was done and again came back with trace leukocyte esterase and trace blood    ASSESSMENT & PLAN:    1. UTI (urinary tract infection)  Urinalysis Macroscopic    Culture, Urine   2. Back spasm     3. Elevated blood pressure     4. Hyperlipidemia       I will culture her urine to see if anything grows out as she really does not want to take antibiotic.  She will continue with her natural methods to decrease her blood pressure and cholesterol.  She is refusing colonoscopy and mammogram and she does not want a lab draw at this point.  I will call her with the culture results and treat if necessary.  Otherwise she can follow-up at her usual interval.    Patient Active Problem List   Diagnosis     Hand arthritis     Hyperlipidemia     Murmur, cardiac     Genital warts     Herpes simplex     Postcoital urinary tract infection     Impaired fasting glucose       Current Outpatient Prescriptions on File Prior to Visit   Medication Sig Dispense Refill     acyclovir (ZOVIRAX) 5 % ointment Apply twice to 5 times daily as needed 15 g 1     aspirin 81 MG EC tablet Take 81 mg by mouth daily.       BIOTIN ORAL Take by mouth.       estradiol (ESTRACE) 0.01 % (0.1 mg/gram) vaginal cream Use sparingly topically twice a week. 42.5 g 3     EVENING PRIMROSE OIL ORAL Take by mouth.       MULTIVIT, CA, MIN/FA/SOY ISOFL (ONE-A-DAY MENOPAUSE FORMULA ORAL) Take by mouth.       multivitamin therapeutic (THERAGRAN) tablet Take 1 tablet by mouth daily.       OMEGA-3/DHA/EPA/FISH OIL (FISH OIL-OMEGA-3 FATTY ACIDS) 300-1,000 mg capsule Take 2 g by mouth daily.       aspirin 325 MG EC tablet Take 325 mg by mouth daily.       cholecalciferol, vitamin D3, 5,000 unit Tab Take by mouth.       nitrofurantoin (MACRODANTIN) 50 MG capsule Take 1  capsule (50 mg total) by mouth as needed (postcoital). 30 capsule 0     No current facility-administered medications on file prior to visit.

## 2021-06-14 NOTE — PROGRESS NOTES
1. Sinus congestion     2. Cough  benzonatate (TESSALON) 200 MG capsule   3. Bacterial sinusitis  amoxicillin-clavulanate (AUGMENTIN) 875-125 mg per tablet   4. Overweight (BMI 25.0-29.9)       Med list reconciled    ASSESSMENT/PLAN:     The following high BMI interventions were performed this visit: weight monitoring    1. Sinus congestion    -continue using Neti pot, refuses nasal steroids    2. Cough    - benzonatate (TESSALON) 200 MG capsule; Take 1 capsule (200 mg total) by mouth 3 (three) times a day as needed for cough.  Dispense: 30 capsule; Refill: 0    3. Bacterial sinusitis    - amoxicillin-clavulanate (AUGMENTIN) 875-125 mg per tablet; Take 1 tablet by mouth 2 (two) times a day for 10 days.  Dispense: 20 tablet; Refill: 0    4. Overweight (BMI 25.0-29.9)        There are no Patient Instructions on file for this visit.  Medications Discontinued During This Encounter   Medication Reason     azithromycin (ZITHROMAX Z-CARLYN) 250 MG tablet Therapy completed     cephalexin (KEFLEX) 500 MG capsule Therapy completed     codeine-guaiFENesin (GUAIFENESIN AC)  mg/5 mL liquid Therapy completed     cyclobenzaprine (FLEXERIL) 10 MG tablet Therapy completed     nitrofurantoin (MACRODANTIN) 50 MG capsule Therapy completed     Return to clinic if symptoms persist or become severe after completing antibiotic therapy.  The visit lasted a total of 25 minutes face to face with the patient.  Over 50% of the time spent counseling and educating the patient about above content.      Yael Waite NP          SUBJECTIVE:  Gala Nicholas is a 62 y.o. female presents for sinus pressure, nasal congestion, increased mucus production and cough that started 3 days ago.  Cough is productive.  She states her symptoms have been constant and she describes it as a dull, throbbing pressure.  Aggravating factors: Laying flat.  Relieving factors: Using her Amy pot 3-4 times per day.  Patient does not use any over-the-counter nasal  steroid sprays for her congestion.  She also reports ear pain left greater than right, postnasal drainage and a sore throat today.  She is rating her pain an 8 out of 10 today.  She is afebrile today.  No history of asthma or smoking history.   Chief Complaint   Patient presents with     URI     x 3 days - cough, head and chest congestion, runny nose, yellow/green mucus and felt feverish         Patient Active Problem List   Diagnosis     Hand arthritis     Hyperlipidemia     Murmur, cardiac     Genital warts     Herpes simplex     Postcoital urinary tract infection     Impaired fasting glucose     Glaucoma     Essential hypertension       Current Outpatient Prescriptions   Medication Sig Dispense Refill     aspirin 81 MG EC tablet Take 81 mg by mouth daily.       estradiol (ESTRACE) 0.01 % (0.1 mg/gram) vaginal cream Use sparingly topically twice a week. 42.5 g 3     EVENING PRIMROSE OIL ORAL Take by mouth.       MULTIVIT, CA, MIN/FA/SOY ISOFL (ONE-A-DAY MENOPAUSE FORMULA ORAL) Take by mouth.       OMEGA-3/DHA/EPA/FISH OIL (FISH OIL-OMEGA-3 FATTY ACIDS) 300-1,000 mg capsule Take 2 g by mouth daily.       timolol (BETIMOL) 0.5 % ophthalmic solution 1 drop 2 (two) times a day.       acyclovir (ZOVIRAX) 5 % ointment Apply twice to 5 times daily as needed 15 g 1     amoxicillin-clavulanate (AUGMENTIN) 875-125 mg per tablet Take 1 tablet by mouth 2 (two) times a day for 10 days. 20 tablet 0     aspirin 325 MG EC tablet Take 325 mg by mouth daily.       benzonatate (TESSALON) 200 MG capsule Take 1 capsule (200 mg total) by mouth 3 (three) times a day as needed for cough. 30 capsule 0     BIOTIN ORAL Take by mouth.       cholecalciferol, vitamin D3, 5,000 unit Tab Take by mouth.       ibuprofen (ADVIL,MOTRIN) 600 MG tablet        multivitamin therapeutic (THERAGRAN) tablet Take 1 tablet by mouth daily.       No current facility-administered medications for this visit.        History   Smoking Status     Former Smoker      Quit date: 9/26/1984   Smokeless Tobacco     Never Used     Comment: light smoker 30 years ago        REVIEW OF SYSTEMS: Denies fever/chills/swollen glands/shortness of breath/discomfort of chest/abdominal pain/changes in bowel habits/urinary symptoms/rash.      TOBACCO USE:  History   Smoking Status     Former Smoker     Quit date: 9/26/1984   Smokeless Tobacco     Never Used     Comment: light smoker 30 years ago      Social History     Social History     Marital status:      Spouse name: Shankar Aly     Number of children: 0     Years of education: 16     Occupational History      [employer]     Social History Main Topics     Smoking status: Former Smoker     Quit date: 9/26/1984     Smokeless tobacco: Never Used      Comment: light smoker 30 years ago      Alcohol use 0.5 oz/week     1 Standard drinks or equivalent per week     Drug use: No     Sexual activity: Yes     Partners: Male     Other Topics Concern     Not on file     Social History Narrative         OBJECTIVE:            Vitals:    12/06/17 1349   BP: 128/80   Pulse: 89   Resp: 16   Temp: 97.9  F (36.6  C)   SpO2: 96%     Weight: 129 lb (58.5 kg)  Wt Readings from Last 3 Encounters:   12/06/17 129 lb (58.5 kg)   04/24/17 132 lb 14.4 oz (60.3 kg)   04/18/17 131 lb 1.6 oz (59.5 kg)     Body mass index is 26.96 kg/(m^2).        Physical Exam:  GENERAL APPEARANCE: A&A, NAD, well hydrated, well nourished  HEAD: atraumatic, no deformity  EYES: PERRL, EOM's intact, no redness, mild mid facial and periorbital swelling bilaterally  EARS: Bilateral TMs with moderate erythema and by moderate bulging, no perforation or fluid  NOSE: Thick, yellow post nasal drainage present, no thrush  MOUTH: Moderate erythema, exudate or thrush  NECK: Supple, without lymphadenopathy, no thyroid mass, no JVD, no bruit  CV: RRR, no M/G/R   LUNGS: CTAB, normal respiratory effort  ABDOMEN: S&NT, no masses, no organomegaly, BS present x4   SKIN:  Normal skin turgor,  no lesions/rashes, warm and dry

## 2021-06-15 ENCOUNTER — HOSPITAL ENCOUNTER (OUTPATIENT)
Dept: CT IMAGING | Facility: CLINIC | Age: 66
Discharge: HOME | End: 2021-06-15
Attending: SPECIALIST
Payer: COMMERCIAL

## 2021-06-15 DIAGNOSIS — N20.1 CALCULUS OF URETER: ICD-10-CM

## 2021-06-15 DIAGNOSIS — N13.2 HYDRONEPHROSIS WITH URINARY OBSTRUCTION DUE TO URETERAL CALCULUS: ICD-10-CM

## 2021-06-15 DIAGNOSIS — N20.0 CALCULUS OF KIDNEY: ICD-10-CM

## 2021-06-15 PROBLEM — I10 ESSENTIAL HYPERTENSION: Chronic | Status: ACTIVE | Noted: 2017-04-27

## 2021-06-15 PROBLEM — H40.9 GLAUCOMA: Status: ACTIVE | Noted: 2017-04-24

## 2021-06-15 NOTE — PROGRESS NOTES
SUBJECTIVE: Gala Nicholas is a 62 y.o.  female who presents today with a complaint of low back pain.  She does have history of repetitive UTI.  On Tuesday to Wednesday she started with some urinary frequency and smelly urine.  She says today it looks turbid and the color looks concentrated despite the fact that she is using detox tea, cranberry juice etc. in order to treat her kidney stone.  We discussed that her back pain could definitely be related to a UTI.  This patient has had chronically elevated blood pressure but is trying to avoid treatment.  Today her blood pressure is better than it has been in the past.  She also has quite high cholesterol numbers.  A year ago her total cholesterol was 298 and her LDL was 177.  She also has been trying to treat this without Western medication.  Her last A1c was 5.9% and she has a mom who is diabetic and is not overweight.  We discussed checking her lab work.    OBJECTIVE: /80  Pulse 84  Temp 98  F (36.7  C)  Wt 130 lb 8 oz (59.2 kg)  BMI 27.27 kg/m2  General: Healthy-appearing overweight  female in no acute distress  Heart: Regular rate and rhythm without murmur  Lungs: Clear bilaterally  Abdomen: Soft, nontender, no tenderness with percussion of the kidneys  Extremities: Warm, dry and without edema    UA is positive for trace leukocyte esterase but is otherwise negative.    ASSESSMENT & PLAN:    1. Urinary frequency  Urinalysis Macroscopic    Culture, Urine    CANCELED: Urinalysis Macroscopic   2. Essential hypertension  Basic Metabolic Panel   3. Hyperlipidemia, unspecified hyperlipidemia type  Lipid Cascade FASTING   4. Hyperglycemia  Glycosylated Hemoglobin A1c     We discussed that it is possible that she had the start of a UTI but that now it seems to be on its way to being resolved or possibly it is slowly brewing.  Either way she prefers not to be treated with antibiotic and less necessary which I agree with.  I will do a urine culture for  follow-up and let her know how that goes and treat if necessary.  I also will check the above-mentioned lab work and get back to her on those results by mail and only call with grossly abnormal values.  Patient refuses flu shot today.  She should follow-up in no longer than 6 months time.    Patient Active Problem List   Diagnosis     Hand arthritis     Hyperlipidemia     Murmur, cardiac     Genital warts     Herpes simplex     Postcoital urinary tract infection     Impaired fasting glucose     Glaucoma     Essential hypertension     Prediabetes       Current Outpatient Prescriptions on File Prior to Visit   Medication Sig Dispense Refill     acyclovir (ZOVIRAX) 5 % ointment Apply twice to 5 times daily as needed 15 g 1     aspirin 81 MG EC tablet Take 81 mg by mouth daily.       BIOTIN ORAL Take by mouth.       cholecalciferol, vitamin D3, 5,000 unit Tab Take by mouth.       estradiol (ESTRACE) 0.01 % (0.1 mg/gram) vaginal cream Use sparingly topically twice a week. 42.5 g 3     EVENING PRIMROSE OIL ORAL Take by mouth.       ibuprofen (ADVIL,MOTRIN) 600 MG tablet        MULTIVIT, CA, MIN/FA/SOY ISOFL (ONE-A-DAY MENOPAUSE FORMULA ORAL) Take by mouth.       multivitamin therapeutic (THERAGRAN) tablet Take 1 tablet by mouth daily.       OMEGA-3/DHA/EPA/FISH OIL (FISH OIL-OMEGA-3 FATTY ACIDS) 300-1,000 mg capsule Take 2 g by mouth daily.       timolol (BETIMOL) 0.5 % ophthalmic solution 1 drop 2 (two) times a day.       aspirin 325 MG EC tablet Take 325 mg by mouth daily.       benzonatate (TESSALON) 200 MG capsule Take 1 capsule (200 mg total) by mouth 3 (three) times a day as needed for cough. 30 capsule 0     No current facility-administered medications on file prior to visit.

## 2021-06-16 ENCOUNTER — OFFICE VISIT - HEALTHEAST (OUTPATIENT)
Dept: UROLOGY | Facility: CLINIC | Age: 66
End: 2021-06-16

## 2021-06-16 DIAGNOSIS — N20.0 CALCULUS OF KIDNEY: ICD-10-CM

## 2021-06-16 PROBLEM — R73.03 PREDIABETES: Status: ACTIVE | Noted: 2018-01-25

## 2021-06-17 NOTE — TELEPHONE ENCOUNTER
Attempted to reach patient for f/u, no answer and voicemail box is not set up.  Marzena Martino RN

## 2021-06-17 NOTE — ED PROVIDER NOTES
EMERGENCY DEPARTMENT ENCOUnter      NAME: Gala Nicholas  AGE: 66 y.o. female  YOB: 1955  MRN: [id number]  EVALUATION DATE & TIME: 2021  8:50 AM    PCP: Yessica Nolan MD    ED PROVIDER: Lulu Herron MD      Chief Complaint   Patient presents with     Abdominal Pain     Constipation         FINAL IMPRESSION:  1. Nephrolithiasis    2. Constipation, unspecified constipation type    3. Ureterolithiasis          ED COURSE & MEDICAL DECISION MAKIN:23AM Met with patient for initial interview and exam. Discussed initial plan for care for their stay in the emergency department. PPE: Provider wore gloves, goggles, and surgical mask.   12:19 PM I reassessed the patient and updated her on the results. I discussed the plan for discharge with the patient, and patient is agreeable. We discussed supportive cares at home and reasons for return to the ER including new or worsening symptoms - all questions and concerns addressed. Patient to be discharged by RN.     Pertinent Labs & Imaging studies reviewed. (See chart for details)  66 y.o. female presents to the Emergency Department for evaluation of abdominal pain, flank pain, constipation and discomfort found to have 4-5 mm obstructing 4-5 mm right ureteropelvic junction stone on CT. There is also large amount of stool in the right and transverse colon. Elevation in lactate to 2.9, ordered 1 L of NS repeat lactate is 1.8. There are no signs of associated infection. Patient's pain likely secondary to passing ureteral stone.  I discussed this with the patient.  I discussed with her that if she develops constitutional symptoms, she should return.  Patient was given an oral laxative for constipation as well.  I referred her for close follow-up with [facility].    At the conclusion of the encounter I discussed the results of all of the tests and the disposition. The questions were answered. The patient or family acknowledged understanding and was  agreeable with the care plan.       MEDICATIONS GIVEN IN THE EMERGENCY:  Medications   sodium chloride flush 10 mL (NS) (has no administration in time range)   iopamidol solution 100 mL (ISOVUE-370) (75 mL Intravenous Given 5/24/21 1010)   morphine injection 4 mg (4 mg Intravenous Given 5/24/21 1033)   ondansetron injection 4 mg (ZOFRAN) (4 mg Intravenous Given 5/24/21 1033)   sodium chloride 0.9% 1,000 mL (1,000 mL Intravenous New Bag 5/24/21 1033)       NEW PRESCRIPTIONS STARTED AT TODAY'S ER VISIT  Current Discharge Medication List      START taking these medications    Details   acetaminophen (TYLENOL) 500 MG tablet Take 2 tablets (1,000 mg total) by mouth 4 (four) times a day for 7 days.  Qty: 56 tablet, Refills: 0    Associated Diagnoses: Ureterolithiasis      dimenhyDRINATE (DRAMAMINE) 50 MG tablet Take 1 tablet (50 mg total) by mouth at bedtime for 7 days.  Qty: 7 tablet, Refills: 0    Associated Diagnoses: Ureterolithiasis      magnesium citrate solution Take half bottle. If not bowel movement after 12 hours, please take the rest of the bottle  Qty: 296 mL, Refills: 0    Associated Diagnoses: Constipation, unspecified constipation type      ondansetron (ZOFRAN ODT) 4 MG disintegrating tablet Take 1 tablet (4 mg total) by mouth every 8 (eight) hours as needed for nausea.  Qty: 12 tablet, Refills: 0    Associated Diagnoses: Ureterolithiasis      oxyCODONE (ROXICODONE) 5 MG immediate release tablet Every 4-6 hours as needed if pain is not improved with acetaminophen and ibuprofen.  Qty: 12 tablet, Refills: 0    Associated Diagnoses: Ureterolithiasis         CONTINUE these medications which have CHANGED    Details   ibuprofen (ADVIL,MOTRIN) 200 MG tablet Take 2 tablets (400 mg total) by mouth 4 (four) times a day for 7 days.  Qty: 56 tablet, Refills: 0    Associated Diagnoses: Ureterolithiasis         CONTINUE these medications which have NOT CHANGED    Details   acyclovir (ZOVIRAX) 5 % ointment Apply twice to  5 times daily as needed  Qty: 15 g, Refills: 1    Associated Diagnoses: Herpes simplex      !! aspirin 325 MG EC tablet Take 325 mg by mouth as needed.             !! aspirin 81 MG EC tablet Take 81 mg by mouth daily.      !! azithromycin (ZITHROMAX Z-CARLYN) 250 MG tablet Take 2 tabs day 1, then 1 tab for next 4 days  Qty: 6 tablet, Refills: 0    Associated Diagnoses: Pneumonia due to infectious organism, unspecified laterality, unspecified part of lung      !! azithromycin (ZITHROMAX Z-CARLYN) 250 MG tablet 2 tabs today then 1 daily x 4 days  Qty: 6 tablet, Refills: 0    Associated Diagnoses: Bronchitis      !! azithromycin (ZITHROMAX Z-CARYLN) 250 MG tablet 2 tabs today then 1 daily x 4 days  Qty: 6 tablet, Refills: 0    Associated Diagnoses: Upper respiratory tract infection, unspecified type      BIOTIN ORAL Take 1 tablet by mouth daily.             cholecalciferol, vitamin D3, 5,000 unit Tab Take 5,000 Units by mouth as needed.             !! codeine-guaiFENesin (GUAIFENESIN AC)  mg/5 mL liquid 2 TSP Q 6H PRN cough-advise pt not to drive while taking medication.  Qty: 120 mL, Refills: 0    Associated Diagnoses: Bronchitis      !! codeine-guaiFENesin (GUAIFENESIN AC)  mg/5 mL liquid 2 TSP Q 6H PRN cough-advise pt not to drive while taking medication.  Qty: 120 mL, Refills: 0    Associated Diagnoses: Cough      estradiol (ESTRACE) 0.01 % (0.1 mg/gram) vaginal cream Use sparingly topically twice a week.  Qty: 42.5 g, Refills: 3    Associated Diagnoses: Atrophic vaginitis      EVENING PRIMROSE OIL ORAL Take 1 tablet by mouth 2 (two) times a day.             FA/mv,Ca,iron,min/lycopene/lut (MULTIVITAL ORAL) Take 1 tablet by mouth daily.      nitrofurantoin (MACRODANTIN) 50 MG capsule Take 1 capsule (50 mg total) by mouth as needed.  Qty: 30 capsule, Refills: 0    Associated Diagnoses: Prophylactic antibiotic      OMEGA-3/DHA/EPA/FISH OIL (FISH OIL-OMEGA-3 FATTY ACIDS) 300-1,000 mg capsule Take 2 g by mouth daily.  "     RED YEAST RICE ORAL Take 1 tablet by mouth as needed.      RESTASIS 0.05 % ophthalmic emulsion INSTILL 1 DROP INTO BOTH EYES TWICE A DAY  Refills: 11      timolol (BETIMOL) 0.5 % ophthalmic solution 1 drop 2 (two) times a day.      UNABLE TO FIND Med Name: Amberen 1300 mg Take 2 capsules by mouth daily       !! - Potential duplicate medications found. Please discuss with provider.             =================================================================    HPI    Patient information was obtained from: patient     Use of Intrepreter: N/A         Gala Nicholas is a 66 y.o. female with a pertinent history of hypertension and hyperlipidemia who presents to the ED as a walk in for evaluation of abdominal pain and constipation.     Patient follows a high fiber diet and normally has regular bowel movements x3 each day. However, she has not had a bowel movement for the past 2 days and reports abdominal distention. She was able to pass a very small amount of gas last night for the first time, but feels as if there is a blockage or a \"line that the stool won't pass,\" as it never travels to her rectum to allow her to have a bowel movement. This morning she attempted to eat a small amount of applesauce, but subsequently developed 8/10 crampy lower abdominal pain. Reports one episode of nausea and vomiting yesterday, which has since resolved. Also had right flank pain a couple of days ago, which has since resolved. Endorses fever, chills, and diaphoresis, but attributes them to menopause. Otherwise denies difficulty urinating, cough, or any other complaints at this time. Notes that her father has a history of kidney stones and she regularly takes supplements out of fear that she might get a kidney stone.       Past Social History:  Tobacco: Non-smoker    REVIEW OF SYSTEMS   Review of Systems   Constitutional: Positive for chills (attributed to menopause), diaphoresis (attributed to menopause) and fever (attributed to " menopause).   Respiratory: Negative for cough.    Gastrointestinal: Positive for abdominal distention, abdominal pain (lower), constipation, nausea (since resolved) and vomiting (since resolved).   Genitourinary: Positive for flank pain (right; since resolved). Negative for difficulty urinating.   All other systems reviewed and are negative.       PAST MEDICAL HISTORY:  Past Medical History:   Diagnosis Date     H/O colposcopy with cervical biopsy      Post-menopausal        PAST SURGICAL HISTORY:  Past Surgical History:   Procedure Laterality Date     CERVICAL BIOPSY  W/ LOOP ELECTRODE EXCISION             CURRENT MEDICATIONS:    No current facility-administered medications on file prior to encounter.      Current Outpatient Medications on File Prior to Encounter   Medication Sig     acyclovir (ZOVIRAX) 5 % ointment Apply twice to 5 times daily as needed     aspirin 325 MG EC tablet Take 325 mg by mouth as needed.            aspirin 81 MG EC tablet Take 81 mg by mouth daily.     azithromycin (ZITHROMAX Z-CARLYN) 250 MG tablet Take 2 tabs day 1, then 1 tab for next 4 days     azithromycin (ZITHROMAX Z-CARLYN) 250 MG tablet 2 tabs today then 1 daily x 4 days     azithromycin (ZITHROMAX Z-CARLYN) 250 MG tablet 2 tabs today then 1 daily x 4 days     BIOTIN ORAL Take 1 tablet by mouth daily.            cholecalciferol, vitamin D3, 5,000 unit Tab Take 5,000 Units by mouth as needed.            codeine-guaiFENesin (GUAIFENESIN AC)  mg/5 mL liquid 2 TSP Q 6H PRN cough-advise pt not to drive while taking medication.     codeine-guaiFENesin (GUAIFENESIN AC)  mg/5 mL liquid 2 TSP Q 6H PRN cough-advise pt not to drive while taking medication.     estradiol (ESTRACE) 0.01 % (0.1 mg/gram) vaginal cream Use sparingly topically twice a week.     EVENING PRIMROSE OIL ORAL Take 1 tablet by mouth 2 (two) times a day.            FA/mv,Ca,iron,min/lycopene/lut (MULTIVITAL ORAL) Take 1 tablet by mouth daily.     nitrofurantoin  (MACRODANTIN) 50 MG capsule Take 1 capsule (50 mg total) by mouth as needed.     OMEGA-3/DHA/EPA/FISH OIL (FISH OIL-OMEGA-3 FATTY ACIDS) 300-1,000 mg capsule Take 2 g by mouth daily.     RED YEAST RICE ORAL Take 1 tablet by mouth as needed.     RESTASIS 0.05 % ophthalmic emulsion INSTILL 1 DROP INTO BOTH EYES TWICE A DAY     timolol (BETIMOL) 0.5 % ophthalmic solution 1 drop 2 (two) times a day.     UNABLE TO FIND Med Name: Amberen 1300 mg Take 2 capsules by mouth daily     [DISCONTINUED] ibuprofen (ADVIL,MOTRIN) 200 MG tablet Take 200 mg by mouth every 6 (six) hours as needed for pain.       ALLERGIES:  Allergies   Allergen Reactions     Sulfa (Sulfonamide Antibiotics) Rash       FAMILY HISTORY:  Family History   Problem Relation Age of Onset     Diabetes Mother      Hypertension Father      Cancer Father      Heart disease Paternal Grandfather      Atrial fibrillation Paternal Grandfather      Stroke Paternal Grandmother      Atrial fibrillation Brother      Hypertension Maternal Grandmother        SOCIAL HISTORY:   Social History     Socioeconomic History     Marital status:      Spouse name: Shankar Aly     Number of children: 0     Years of education: 16     Highest education level: None   Occupational History     None   Social Needs     Financial resource strain: None     Food insecurity     Worry: None     Inability: None     Transportation needs     Medical: None     Non-medical: None   Tobacco Use     Smoking status: Former Smoker     Quit date: 1984     Years since quittin.6     Smokeless tobacco: Never Used     Tobacco comment: light smoker 30 years ago    Substance and Sexual Activity     Alcohol use: Yes     Alcohol/week: 0.8 standard drinks     Types: 1 Standard drinks or equivalent per week     Drug use: No     Sexual activity: Yes     Partners: Male   Lifestyle     Physical activity     Days per week: None     Minutes per session: None     Stress: None   Relationships     Social  connections     Talks on phone: None     Gets together: None     Attends Synagogue service: None     Active member of club or organization: None     Attends meetings of clubs or organizations: None     Relationship status: None     Intimate partner violence     Fear of current or ex partner: None     Emotionally abused: None     Physically abused: None     Forced sexual activity: None   Other Topics Concern     None   Social History Narrative     None       VITALS:  Patient Vitals for the past 24 hrs:   BP Temp Temp src Pulse Resp SpO2 Weight   05/24/21 1145 177/79 -- -- 77 -- 97 % --   05/24/21 1130 180/83 -- -- 75 -- 97 % --   05/24/21 1115 158/78 -- -- 75 -- 97 % --   05/24/21 1100 164/79 -- -- 82 -- 97 % --   05/24/21 1045 173/82 -- -- 83 -- 93 % --   05/24/21 1000 169/80 -- -- 77 -- 100 % --   05/24/21 0945 174/85 -- -- 75 -- 100 % --   05/24/21 0930 (!) 182/86 -- -- 75 -- 99 % --   05/24/21 0915 (!) 181/88 -- -- 77 -- 99 % --   05/24/21 0900 179/85 -- -- 83 -- 99 % --   05/24/21 0851 (!) 202/96 98.2  F (36.8  C) Oral 91 22 100 % 136 lb (61.7 kg)       PHYSICAL EXAM    Constitutional: Alert, awake, NAD  Eyes: PERRL  ENT: Pharynx normal. No cervical lymphadenopathy.   Respiratory: CTA bilaterally, normal respiratory rate  Cardiac: Regular rate and rhythm, no murmur  Abdomen: Soft, lower abdominal tenderness, with normoactive bowel sounds, no rebound, no guarding. Right CVA tenderness.  Integument: Color normal, no rash, warm, dry  Musculoskeletal: Non-tender, FROM, no edema  Neurologic: Alert, O-X3, no motor deficit, no sensory deficit  Psychiatric: Mood/affect normal     LAB:  All pertinent labs reviewed and interpreted.  Results for orders placed or performed during the hospital encounter of 05/24/21   Lactic Acid   Result Value Ref Range    Lactic Acid 2.9 (H) 0.7 - 2.0 mmol/L   Lipase   Result Value Ref Range    Lipase <9 0 - 52 U/L   Hepatic Profile   Result Value Ref Range    Bilirubin, Total 0.9 0.0 - 1.0  mg/dL    Bilirubin, Direct 0.2 <=0.5 mg/dL    Protein, Total 7.8 6.0 - 8.0 g/dL    Albumin 3.9 3.5 - 5.0 g/dL    Alkaline Phosphatase 80 45 - 120 U/L    AST 20 0 - 40 U/L    ALT 28 0 - 45 U/L   Basic Metabolic Panel   Result Value Ref Range    Sodium 135 (L) 136 - 145 mmol/L    Potassium 3.9 3.5 - 5.0 mmol/L    Chloride 100 98 - 107 mmol/L    CO2 22 22 - 31 mmol/L    Anion Gap, Calculation 13 5 - 18 mmol/L    Glucose 165 (H) 70 - 125 mg/dL    Calcium 9.2 8.5 - 10.5 mg/dL    BUN 16 8 - 22 mg/dL    Creatinine 1.27 (H) 0.60 - 1.10 mg/dL    GFR MDRD Af Amer 51 (L) >60 mL/min/1.73m2    GFR MDRD Non Af Amer 42 (L) >60 mL/min/1.73m2   Urinalysis-UC if Indicated   Result Value Ref Range    Color, UA Light Yellow Light Yellow, Yellow    Clarity, UA Clear Clear    Glucose, UA 70 mg/dL (!) Negative    Protein, UA 20 mg/dL Negative    Bilirubin, UA Negative Negative    Urobilinogen, UA <2.0 mg/dL <2.0 mg/dL    pH, UA 6.5 5.0 - 8.0    Blood, UA 0.03 mg/dL (!) Negative    Ketones, UA Negative Negative    Nitrite, UA Negative Negative    Leukocytes, UA 25 Lizz/uL (!) Negative    Specific Gravity, UA 1.047 (H) 1.001 - 1.030    RBC, UA 6 (H) <=2 hpf    WBC UA 5 <=5 hpf    Bacteria, UA Few (!) None Seen    Squamous Epithel, UA <1 <=5 /HPF    Mucus, UA Present (!) None Seen lpf   HM1 (CBC with Diff)   Result Value Ref Range    WBC 11.9 (H) 4.0 - 11.0 thou/uL    RBC 4.39 3.80 - 5.40 mill/uL    Hemoglobin 13.9 12.0 - 16.0 g/dL    Hematocrit 40.1 35.0 - 47.0 %    MCV 91 80 - 100 fL    MCH 31.7 27.0 - 34.0 pg    MCHC 34.7 32.0 - 36.0 g/dL    RDW 11.5 11.0 - 14.5 %    Platelets 327 140 - 440 thou/uL    MPV 8.5 8.5 - 12.5 fL    Neutrophils % 83 (H) 50 - 70 %    Lymphocytes % 11 (L) 20 - 40 %    Monocytes % 6 2 - 10 %    Eosinophils % 0 0 - 6 %    Basophils % 0 0 - 2 %    Immature Granulocyte % 1 (H) <=0 %    Neutrophils Absolute 9.9 (H) 2.0 - 7.7 thou/uL    Lymphocytes Absolute 1.3 0.8 - 4.4 thou/uL    Monocytes Absolute 0.8 0.0 - 0.9  thou/uL    Eosinophils Absolute 0.0 0.0 - 0.4 thou/uL    Basophils Absolute 0.0 0.0 - 0.2 thou/uL    Immature Granulocyte Absolute 0.1 (H) <=0.0 thou/uL   Lactic Acid   Result Value Ref Range    Lactic Acid 1.8 0.7 - 2.0 mmol/L       RADIOLOGY:  Reviewed all pertinent imaging. Please see official radiology report.  Ct Abdomen Pelvis Without Oral With Iv Contrast    Result Date: 5/24/2021  EXAM: CT ABDOMEN PELVIS WO ORAL W IV CONTRAST LOCATION: [facility] DATE/TIME: 5/24/2021 10:24 AM INDICATION: abdominal pain, constipation COMPARISON: None. TECHNIQUE: CT scan of the abdomen and pelvis was performed following injection of IV contrast. Multiplanar reformats were obtained. Dose reduction techniques were used. CONTRAST: Iopamidol (Isovue-370) 75mL FINDINGS: LOWER CHEST: Normal. HEPATOBILIARY: Diffuse hepatic steatosis. No significant mass. No bile duct dilatation. No calcified gallstones. PANCREAS: Normal. SPLEEN: Normal. ADRENAL GLANDS: Normal. KIDNEYS/BLADDER: An obstructing 4-5 mm right ureteropelvic junction stone causes mild hydronephrosis and moderate to marked perinephric edema. Nonobstructing 3 mm and 2 mm stones lower pole left kidney. BOWEL: Mild nonobstructive gaseous distention and relatively large stool burden transverse and right colon. Normal appendix. LYMPH NODES: Normal. VASCULATURE: Unremarkable. PELVIC ORGANS: Trace amount of free fluid in the pelvis. Several benign calcified uterine fibroids. MUSCULOSKELETAL: Unremarkable.     1.  An obstructing 4-5 mm right ureteropelvic junction stone causes mild hydronephrosis but moderate to marked perinephric edema which may indicate greater degree of obstruction. 2.  Nonobstructing 3 mm and 2 mm stones lower pole left kidney. 3.  Mild nonobstructive gaseous distention and relatively large stool burden transverse and right colon.       IAnna, am serving as a scribe to document services personally performed by Dr. Herron  based on my observation and the provider's statements to me. I, Lulu Herron MD attest that Anna Martha is acting in a scribe capacity, has observed my performance of the services and has documented them in accordance with my direction.    Lulu Herron MD  Emergency Medicine  [facility] EMERGENCY ROOM  [address] [facility] [place] [address]  Dept: [phone]  Loc: [phone]       Lulu Herron MD  05/24/21 1257

## 2021-06-17 NOTE — ED TRIAGE NOTES
Patient has constipation- no bowel movement for 2 days. Nausea and one emesis. Right flank pain other day, resolved.

## 2021-06-17 NOTE — PROGRESS NOTES
"Assessment/Plan:        Diagnoses and all orders for this visit:    Calculus of ureter  -     Symptom Control While Passing a Stone Education  -     Patient Stated Goal: Pass my stone  -     CT Abdomen Pelvis Without Oral Without IV Contrast; Future; Expected date: 06/25/2021    Calculus of kidney  -     Symptom Control While Passing a Stone Education  -     Patient Stated Goal: Pass my stone  -     CT Abdomen Pelvis Without Oral Without IV Contrast; Future; Expected date: 06/25/2021    Hydronephrosis with urinary obstruction due to ureteral calculus  -     Symptom Control While Passing a Stone Education  -     Patient Stated Goal: Pass my stone  -     CT Abdomen Pelvis Without Oral Without IV Contrast; Future; Expected date: 06/25/2021      Stone Management Plan  [facility] 5/25/2021   Urinary Tract Infection No suspicion of infection   Renal Colic Well controlled symptoms   Renal Failure No suspicion of renal failure   Current CT date 5/24/2021   Right sided stones? Yes   R Number of ureteral stones 1   R GSD of ureteral stones 5   R Location of ureteral stone Proximal   R Number of kidney stones  No renal stones   R Hydronephrosis Mild   R Stone Event New event   Diagnosis date 5/24/2021   Initial location of primary symptomatic stone Proximal   Initial GSD of primary symptomatic stone 5   R MET status Initiation   R Current Plan MET   MET (No Data)   Left sided stones? Yes   L Number of ureteral stones No ureteral stones   L Number of kidney stones  2   L GSD of kidney stones 2 - 4   L Hydronephrosis Mild   L Stone Event No current event   L Current Plan Observe   Observe rationale Limited stone burden with good prognosis for spontaneous passage             Phone call duration: 26 minutes   23 minutes chart review test review interpretation of tests ordered tests documentation total 49 minutes    Clint Infante MD    Subjective:        The patient has been notified of following:     \"This " "telephone visit will be conducted via a call between you and your physician/provider. We have found that certain health care needs can be provided without the need for a physical exam.  This service lets us provide the care you need with a short phone conversation. If a prescription is necessary, we can send it directly to your pharmacy.  If labs and/or imaging are needed, we can place orders so that you can have the test (s) done at a later time.    If during the course of the call the physician/provider feels a telephone visit is not appropriate, you will not be charged for this service.\"     HPI  Ms. Gala Nicholas is a 66 y.o.  female who is being evaluated via a billable telephone visit by [facility] [place] with history of onset of flank pain that was intermittent several days prior to 24th.  She subsequently had problems with constipation and increasing pain in the right and presented to the ER.  UA at that time showed microscopic hematuria specific gravity 1.047 pH 6.5 - for nitrate bacteria.  Culture showed no growth.  Lactic acid was elevated at 2.9 but on repeat was 1.8.  White blood count 1300 hemoglobin 13.9 sodium 135 potassium 3.9 calcium 9.2 creatinine 1.7.    Personal review of CT scan with IV contrast obtained 5/24/2021 demonstrated a 5 x 3.5 x 3.5 mm stone in the region of the UPJ or proximal right ureter.  There was mild hydronephrosis.  No other stones seen on the right.  On the left there are 2 stones measuring 3 and 4 mm respectively nonobstructing.  Other stones could be present obliterated by contrast.    Patient's pain is fairly well controlled.    This is patient's first stone with family history positive in father.    Impression right proximal ureteral stone, left renal stone x2 as noted above    Plan met with 4-week follow-up with CT  Went over use of acetaminophen 1000 mg or ibuprofen 400 mg every 6 Dramamine 50 at bedtime with oxycodone for severe pain Zofran for " nausea.  Patient is allergic to sulfa and so we will not place her on Flomax.  Patient understands stone may take several weeks to pass.  If she has significant pain that is intolerable she will let us know and we can proceed with clearance recently but emphasized would like to avoid this if possible.    .    PLAN             ROS   Review of systems is negative except for HPI    Past Medical History:   Diagnosis Date     H/O colposcopy with cervical biopsy      Post-menopausal        Past Surgical History:   Procedure Laterality Date     CERVICAL BIOPSY  W/ LOOP ELECTRODE EXCISION         Current Outpatient Medications   Medication Sig Dispense Refill     acetaminophen (TYLENOL) 500 MG tablet Take 2 tablets (1,000 mg total) by mouth 4 (four) times a day for 7 days. 56 tablet 0     acyclovir (ZOVIRAX) 5 % ointment Apply twice to 5 times daily as needed 15 g 1     aspirin 81 MG EC tablet Take 81 mg by mouth daily.       BIOTIN ORAL Take 1 tablet by mouth daily.              cholecalciferol, vitamin D3, 5,000 unit Tab Take 5,000 Units by mouth as needed.              dimenhyDRINATE (DRAMAMINE) 50 MG tablet Take 1 tablet (50 mg total) by mouth at bedtime for 7 days. 7 tablet 0     estradiol (ESTRACE) 0.01 % (0.1 mg/gram) vaginal cream Use sparingly topically twice a week. 42.5 g 3     EVENING PRIMROSE OIL ORAL Take 1 tablet by mouth 2 (two) times a day.              FA/mv,Ca,iron,min/lycopene/lut (MULTIVITAL ORAL) Take 1 tablet by mouth daily.       ibuprofen (ADVIL,MOTRIN) 200 MG tablet Take 2 tablets (400 mg total) by mouth 4 (four) times a day for 7 days. 56 tablet 0     magnesium citrate solution Take half bottle. If not bowel movement after 12 hours, please take the rest of the bottle 296 mL 0     OMEGA-3/DHA/EPA/FISH OIL (FISH OIL-OMEGA-3 FATTY ACIDS) 300-1,000 mg capsule Take 2 g by mouth daily.       ondansetron (ZOFRAN ODT) 4 MG disintegrating tablet Take 1 tablet (4 mg total) by mouth every 8 (eight) hours as  needed for nausea. 12 tablet 0     oxyCODONE (ROXICODONE) 5 MG immediate release tablet Every 4-6 hours as needed if pain is not improved with acetaminophen and ibuprofen. 12 tablet 0     RED YEAST RICE ORAL Take 1 tablet by mouth as needed.       RESTASIS 0.05 % ophthalmic emulsion INSTILL 1 DROP INTO BOTH EYES TWICE A DAY  11     timolol (BETIMOL) 0.5 % ophthalmic solution 1 drop 2 (two) times a day.       UNABLE TO FIND Med Name: Amberen 1300 mg Take 2 capsules by mouth daily       aspirin 325 MG EC tablet Take 325 mg by mouth as needed.              azithromycin (ZITHROMAX Z-CARLYN) 250 MG tablet Take 2 tabs day 1, then 1 tab for next 4 days 6 tablet 0     azithromycin (ZITHROMAX Z-CARLYN) 250 MG tablet 2 tabs today then 1 daily x 4 days 6 tablet 0     azithromycin (ZITHROMAX Z-CARLYN) 250 MG tablet 2 tabs today then 1 daily x 4 days 6 tablet 0     codeine-guaiFENesin (GUAIFENESIN AC)  mg/5 mL liquid 2 TSP Q 6H PRN cough-advise pt not to drive while taking medication. 120 mL 0     codeine-guaiFENesin (GUAIFENESIN AC)  mg/5 mL liquid 2 TSP Q 6H PRN cough-advise pt not to drive while taking medication. 120 mL 0     nitrofurantoin (MACRODANTIN) 50 MG capsule Take 1 capsule (50 mg total) by mouth as needed. 30 capsule 0     No current facility-administered medications for this visit.        Allergies   Allergen Reactions     Sulfa (Sulfonamide Antibiotics) Rash       Social History     Socioeconomic History     Marital status:      Spouse name: Shankar Aly     Number of children: 0     Years of education: 16     Highest education level: Not on file   Occupational History     Not on file   Social Needs     Financial resource strain: Not on file     Food insecurity     Worry: Not on file     Inability: Not on file     Transportation needs     Medical: Not on file     Non-medical: Not on file   Tobacco Use     Smoking status: Former Smoker     Quit date: 1984     Years since quittin.6     Smokeless  tobacco: Never Used     Tobacco comment: light smoker 30 years ago    Substance and Sexual Activity     Alcohol use: Yes     Alcohol/week: 0.8 standard drinks     Types: 1 Standard drinks or equivalent per week     Drug use: No     Sexual activity: Yes     Partners: Male   Lifestyle     Physical activity     Days per week: Not on file     Minutes per session: Not on file     Stress: Not on file   Relationships     Social connections     Talks on phone: Not on file     Gets together: Not on file     Attends [employer] service: Not on file     Active member of club or organization: Not on file     Attends meetings of clubs or organizations: Not on file     Relationship status: Not on file     Intimate partner violence     Fear of current or ex partner: Not on file     Emotionally abused: Not on file     Physically abused: Not on file     Forced sexual activity: Not on file   Other Topics Concern     Not on file   Social History Narrative     Not on file       Family History   Problem Relation Age of Onset     Diabetes Mother      Hypertension Father      Cancer Father      Heart disease Paternal Grandfather      Atrial fibrillation Paternal Grandfather      Stroke Paternal Grandmother      Atrial fibrillation Brother      Hypertension Maternal Grandmother        Objective:      Physical Exam  There were no vitals filed for this visit.    Labs    Urinalysis POC (Office):  Nitrite, UA   Date Value Ref Range Status   05/24/2021 Negative Negative Final   01/22/2018 Negative Negative Final   04/18/2017 Negative Negative Final       Lab Urinalysis:  Blood, UA   Date Value Ref Range Status   05/24/2021 0.03 mg/dL (!) Negative Final   01/22/2018 Negative Negative Final   04/18/2017 Trace (!) Negative Final     Nitrite, UA   Date Value Ref Range Status   05/24/2021 Negative Negative Final   01/22/2018 Negative Negative Final   04/18/2017 Negative Negative Final     Leukocytes, UA   Date Value Ref Range Status   05/24/2021 25  Lizz/uL (!) Negative Final   01/22/2018 Trace (!) Negative Final   04/18/2017 Trace (!) Negative Final     pH, UA   Date Value Ref Range Status   05/24/2021 6.5 5.0 - 8.0 Final   01/22/2018 6.5 5.0 - 8.0 Final   04/18/2017 5.5 5.0 - 8.0 Final    and Acute Labs   CBC   WBC   Date Value Ref Range Status   05/24/2021 11.9 (H) 4.0 - 11.0 thou/uL Final   04/26/2019 5.1 4.0 - 11.0 thou/uL Final     Hemoglobin   Date Value Ref Range Status   05/24/2021 13.9 12.0 - 16.0 g/dL Final   04/26/2019 13.6 12.0 - 16.0 g/dL Final     Platelets   Date Value Ref Range Status   05/24/2021 327 140 - 440 thou/uL Final   04/26/2019 377 140 - 440 thou/uL Final   , C Reactive Protein  No results found for: CRP and Renal Panel  [facility]  Creatinine   Date Value Ref Range Status   05/24/2021 1.27 (H) 0.60 - 1.10 mg/dL Final   04/26/2019 0.75 0.60 - 1.10 mg/dL Final   01/22/2018 0.72 0.60 - 1.10 mg/dL Final     Potassium   Date Value Ref Range Status   05/24/2021 3.9 3.5 - 5.0 mmol/L Final   04/26/2019 4.0 3.5 - 5.0 mmol/L Final   01/22/2018 4.1 3.5 - 5.0 mmol/L Final     Calcium   Date Value Ref Range Status   05/24/2021 9.2 8.5 - 10.5 mg/dL Final   04/26/2019 10.0 8.5 - 10.5 mg/dL Final   01/22/2018 9.4 8.5 - 10.5 mg/dL Final

## 2021-06-19 NOTE — LETTER
Letter by Yessica oNlan MD at      Author: Yessica Nolan MD Service: -- Author Type: --    Filed:  Encounter Date: 5/20/2019 Status: (Other)        [facility] PATIENT ACCESS  [address] [place] [address]  [phone]         Gala Nicholas  [address] [address]        05/20/19    Dear Gala Nicholas,     At [facility] we care about your health and well-being. Your primary care provider is committed to ensuring you receive high quality care and has chosen a network of specialists to assist in providing that care. Recently, Dr. Nolan referred you to [facility] for specialty care. We have made several attempts to connect with you to assist with scheduling, however we have been unable to reach you by phone.       It is important to your overall health to follow through with the recommendation from your provider. Please call [phone] option 2 at your earliest convenience for assistance in scheduling an appointment.  If you have already scheduled this appointment, please disregard this notice.  Thank you for choosing [facility] for your healthcare needs.       Sincerely,       [facility]

## 2021-06-19 NOTE — PROGRESS NOTES
"Chief Complaint   Patient presents with     Insect Bite     Pt c/o bug bite R bicep x 5 days. Red ring with clear center.       HPI: Very interesting 63-year-old female who presents today with a lesion on her right upper extremity medial aspect.  She noted it within the past 24 hours by a coworker.  She is concerned that it may be a tick bite or a bug bite.  It is minimally tender.  No exudate.    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    ROS: No fever.  No pain or paresthesias.    SH:    reports that she quit smoking about 33 years ago. She has never used smokeless tobacco. She reports that she drinks about 0.5 oz of alcohol per week  She reports that she does not use illicit drugs.      FH: The Patient's family history includes Atrial fibrillation in her brother and paternal grandfather; Cancer in her father; Diabetes in her mother; Heart disease in her paternal grandfather; Hypertension in her father and maternal grandmother; Stroke in her paternal grandmother.     Meds:  Gala has a current medication list which includes the following prescription(s): acyclovir, aspirin, aspirin, benzonatate, biotin, cholecalciferol (vitamin d3), doxycycline, estradiol, evening primrose oil, ibuprofen, mvit-mins/folic acid/soy isofl, multivitamin therapeutic, fish oil-omega-3 fatty acids, and timolol.    O:  /70  Pulse 80  Resp 16  Ht 4' 11\" (1.499 m)  Wt 131 lb 5 oz (59.6 kg)  SpO2 99%  BMI 26.52 kg/m2  Examination of the right upper extremity shows a 1 cm round lesion on the medial aspect of the right upper arm with a pale center.  This circumference is ecchymotic.    A/P:   1. Insect bite, initial encounter  -Cannot rule out tickborne disease although no active tic was noted.  Doxycycline chosen out of caution for rickettsial diseases.  - doxycycline (VIBRA-TABS) 100 MG tablet; Take 1 tablet (100 mg total) by mouth 2 (two) times a " day for 14 days.  Dispense: 28 tablet; Refill: 0

## 2021-06-19 NOTE — LETTER
Letter by Yessica Nolan MD at      Author: Yessica Nolan MD Service: -- Author Type: --    Filed:  Encounter Date: 4/28/2019 Status: (Other)         Gala Nicholas  [address] [address]             April 28, 2019         Dear Ms. Nicholas,    Below are the results from your recent visit:    Resulted Orders   Lipid Cascade   Result Value Ref Range    Cholesterol 251 (H) <=199 mg/dL    Triglycerides 147 <=149 mg/dL    HDL Cholesterol 85 >=50 mg/dL    LDL Calculated 137 (H) <=129 mg/dL    Patient Fasting > 8hrs? Unknown    Glycosylated Hemoglobin A1c   Result Value Ref Range    Hemoglobin A1c 6.2 (H) 3.5 - 6.0 %   Comprehensive Metabolic Panel   Result Value Ref Range    Sodium 141 136 - 145 mmol/L    Potassium 4.0 3.5 - 5.0 mmol/L    Chloride 103 98 - 107 mmol/L    CO2 30 22 - 31 mmol/L    Anion Gap, Calculation 8 5 - 18 mmol/L    Glucose 101 70 - 125 mg/dL    BUN 15 8 - 22 mg/dL    Creatinine 0.75 0.60 - 1.10 mg/dL    GFR MDRD Af Amer >60 >60 mL/min/1.73m2    GFR MDRD Non Af Amer >60 >60 mL/min/1.73m2    Bilirubin, Total 0.8 0.0 - 1.0 mg/dL    Calcium 10.0 8.5 - 10.5 mg/dL    Protein, Total 7.4 6.0 - 8.0 g/dL    Albumin 4.2 3.5 - 5.0 g/dL    Alkaline Phosphatase 73 45 - 120 U/L    AST 24 0 - 40 U/L    ALT 26 0 - 45 U/L    Narrative    Fasting Glucose reference range is 70-99 mg/dL per  American Diabetes Association (ADA) guidelines.   HM1 (CBC with Diff)   Result Value Ref Range    WBC 5.1 4.0 - 11.0 thou/uL    RBC 4.17 3.80 - 5.40 mill/uL    Hemoglobin 13.6 12.0 - 16.0 g/dL    Hematocrit 40.4 35.0 - 47.0 %    MCV 97 80 - 100 fL    MCH 32.5 27.0 - 34.0 pg    MCHC 33.6 32.0 - 36.0 g/dL    RDW 12.0 11.0 - 14.5 %    Platelets 377 140 - 440 thou/uL    MPV 6.7 (L) 7.0 - 10.0 fL    Neutrophils % 51 50 - 70 %    Lymphocytes % 40 20 - 40 %    Monocytes % 6 2 - 10 %    Eosinophils % 3 0 - 6 %    Basophils % 1 0 - 2 %    Neutrophils Absolute 2.6 2.0 - 7.7 thou/uL    Lymphocytes Absolute 2.0  0.8 - 4.4 thou/uL    Monocytes Absolute 0.3 0.0 - 0.9 thou/uL    Eosinophils Absolute 0.1 0.0 - 0.4 thou/uL    Basophils Absolute 0.0 0.0 - 0.2 thou/uL       Your cholesterol is still elevated, but improved from your last check.  The other labs look good.  Here is your latest risk for heart attack or stroke in the next 10 years:  The 10-year ASCVD risk score (Jefftawanda WISE Jr., et al., 2013) is: 4.1%    Values used to calculate the score:      Age: 63 years      Sex: Female      Is Non- : No      Diabetic: No      Tobacco smoker: No      Systolic Blood Pressure: 116 mmHg      Is BP treated: Yes      HDL Cholesterol: 85 mg/dL      Total Cholesterol: 251 mg/dL  At this point we can hold off on a statin, but please watch your diet and continue with red yeast rice extract on a regular basis.    Please call with questions or contact us using [employer].    Sincerely,        Electronically signed by Yessica Nolan MD ([place])

## 2021-06-25 NOTE — TELEPHONE ENCOUNTER
Patient calling to request a refill of tylenol and ibuprofen for her stone passage.  She is feeling okay and has not needed to use the oxycodone as yet.  Marzena Martino RN

## 2021-06-26 NOTE — PATIENT INSTRUCTIONS - HE
Patient Stated Goal: Pass my stone  Symptom Control While Passing A Stone    The goal of [facility] [place] is to let a smaller kidney stone (less than 4 to 5 mm) pass without intervention if possible. Giving your body a chance to clear the stone may take a few hours up to a few weeks.  Keeping you well-informed, safe and fairly comfortable is important.    Drink to thirst  Do not attempt to  flush out  your stone by drinking too much fluid. This does not work and may increase nausea. Drink enough to satisfy your body s thirst. Eating your normal diet is fine.   Medications (that may be suggested or prescribed)    Ibuprofen (Advil or Motrin) Available over the counter  o Take two (200mg) tablets every six hours until the stone passes.  o Prevents spasm of the ureter.    o Decreases pain.      Dramamine* (drowsy version, non-generic formulation) Available over the counter  o Take 50mg at bedtime  o Decreases spasms of the ureter  o Decreases nausea  o Decreases acute pain  o Decreases recurrence of pain for 24 hours  o Will help you sleep  *This medication will cause increase drowsiness, do not drive or operate machinery for 6 hours.      Narcotics (Percocet, Vicodin, Dilaudid) Take as prescribed for severe pain unrelieved by ibuprofen and Dramamine  o Narcotics have significant side effects and only  cover-up  pain. They have no effect on the cause of pain.  o Common side effects  - Confusion, disorientation and sedation - DO NOT DRIVE OR OPERATE MACHINERY WITHIN 24 HOURS  - Nausea - take Dramamine or Zofran or Haldol to help control  - Constipation  - Sleep disturbances      Ondansetron (Zofran) Take as prescribed  o Reserve for severe nausea  o May cause constipation, start over the counter Miralax if needed      Second Line Anti-Nausea Medication: Adding a different anti-nausea medication maybe helpful for persistent nausea.  The combined effect of different types of anti-nausea medications maybe more  effective than either medication by itself, even in higher doses.  o Compazine: Take as prescribed      Information about kidney stones    Crystals can form if chemicals are too concentrated in your urine. If the crystal grows over time, a stone may form. A stone usually isn t painful while it is still in the kidney.    As the stone begins to leave the kidney, you may experience episodes of flank pain as the kidney stone approaches the entrance to the ureter. Some people feel a vague ache in the side.    Kidney stones may fall into the ureter. Some stones are tiny and pass through without causing symptoms. The ureter is a small tube (approximately 1/8 of an inch wide). A kidney stone can get stuck and block the ureter. If this happens, urine backs up and flows back to the kidney. Back pressure on the kidney can cause:  o Severe flank pain radiating to the groin.  o Severe nausea and vomiting.  o The pain can occur in the lower back, side, groin or all three.      When the stone reaches the lower ureter, this can irritate the bladder and sensations of feeling the urge to urinate frequently and urgently may occur.      Once the stone passes out of your ureter and into your bladder, the symptoms of urgency and frequency will often disappear. Sometimes pain will come back for a short period and will not be as severe as before. The passage of the stone from your bladder and out of your body is usually not a problem. The urethra is at least twice as wide as the normal ureter, so the stone doesn t usually block it.    Strain all urine  If you pass the stone, save it. Place it in the container we have provided and bring it to the [facility] within a week of passing it. Your stone will then be sent for analysis which takes about a month.     Signs and symptoms you might experience    Nausea    Decreased appetite    Urinary frequency    Bloody urine     Chills    Fatigue    When to call [facility] or  go to the Emergency Room    Fever with a temperature greater than 100.1    Severe pain    Persistent nausea/vomiting    If the pain worsens or nausea/vomiting is uncontrolled with medications, STOP eating & drinking. You need to have an empty stomach for 8 hours prior to surgery. Call the [facility] immediately at [phone].           Follow-up    Low dose CT scan with doctor visit 1-2 weeks after initial clinic visit per doctor s instructions    Please cancel the CT scan visit if you pass a stone. Reschedule for a one month follow-up with doctor to discuss stone composition and future prevention.    Preventing future stones    Approximately a month after your stone is sent out for analysis, a prevention visit will occur with your provider, to discuss an individualized plan for prevention of new stones and to discuss managing stones that you may still have. Along with the analysis of the kidney stone, blood and urine tests may be indicated to develop this plan. Knowing the type of kidney stones you make, and why, allows the providers at the [facility] to recommend specific ways to prevent them.    Follow-up visits are an important part of monitoring and preventing future re-occurrences.    The [facility] is available for questions or concerns 24 hours a day at [phone]

## 2021-06-26 NOTE — PATIENT INSTRUCTIONS - HE
Patient Stated Goal: Prevent further stones  Calcium Oxalate Stone Prevention Self Management    Drink more fluids:    Drinking more liquids is the best way you can help prevent future stones. Stones can form when substances in the urine are too concentrated. The more you drink, the more urine you will make. This means all substances in the urine will be less concentrated.    How much urine should I be producing?    The usual recommended daily urine production is about 2 to 3 quarts (9701-9737 ml). If you are producing more than 3 quarts of urine on a regular basis, it is possible to deplete important minerals stored in the body.    To measure the amount of urine you produce in a day, you can either:    Collect all urine in a container and measure at the end of the day     Use a measuring cup each time you urinate and add up the amounts at the end of the day     Observe    Color - Dark alessandra urine is concentrated. Light straw color or lighter is dilute and desirable     Odor - Concentrated urine tends to smell stronger. Dilute urine is nearly odorless    Ways to increase your fluid intake    Increasing the amount of fluid you drink is effective for all types of kidney stones. While water is commonly recommended, all fluids are effective for increasing the amount of urine your body produces.    Focus on starting a lifelong habit, rather than a short-term solution.     Keep liquids on hand that you like. Crystal Light is a low calorie appropriate choice.    Drink out of larger glasses. You'll tend to drink more with each serving.     Have an additional glass of fluid with each meal.     Keep a water or drink bottle at work and fill it regularly.     *If you are prone to fluid retention, consult your doctor before making changes to your fluid habits.    Low Oxalate Diet:    Avoid excess amounts or daily consumption of these foods:    All nuts and nut products including peanuts, almonds, pecans, peanut butter, almond  milk    Rhubarb    Chocolate    Soybeans and soy products     Spinach    Wheat Germ    Beets    Maintain a normal calcium diet:    Researches have found that people with low calcium intakes tend to have more stones. Foods with high calcium content are acceptable and include:    Dairy products (including milk, cheese and yogurt)    Meat and fish    Enriched cereals    Dark green vegetables    What about calcium supplements?     Many people take calcium supplements, either on their own or as prescribed by a doctor. Research has indicated that calcium supplements do not usually pose a risk for stone formation.  Calcium citrate is a better choice for a supplement.    Avoid excess salt:    Salt (sodium chloride) is found in abundance in many foods. High sodium levels in the urine can interfere with the kidney's handling of calcium.     Tips for reducing the salt in your diet:    Don't use salt at the table    Reduce the salt used in food preparation. Try 1/2 teaspoon when recipes call for 1 teaspoon.    Use herbs and spices for flavoring instead of salt.    Avoid salty foods.    Check the label before you buy or use a product. Note sodium and portion size information.    Try to consume less than 2,000 mg/day. (1 teaspoon = 2,000 mg)    Foods with high sodium content include:    Processed meat (including luncheon meats, sausage)     Crackers     Instant cereal     Processed cheese     Canned soups     Chips and snack foods     Soy sauce    [facility] can respond to your questions or concerns 24 hours a day at [phone].

## 2021-06-26 NOTE — PROGRESS NOTES
Patient states that they are in [place] at the time of their visit.  Patient is aware telehealth visit is billable and agrees to proceed.  Marzena Martino RN

## 2021-06-26 NOTE — PROGRESS NOTES
"SUBJECTIVE: Gala Nicholas is a 66 y.o.  female who presents today for follow-up of an ER visit she had on 5/24 for flank pain and constipation.  A CT of the abdomen and pelvis was done and it showed that she had an obstructing 4 to 5 mm right-sided UPJ stone causing mild hydronephrosis.  There was also marked perinephritis edema.  She had 2 smaller stones in the left kidney that were not obstructing.  She is not sure why she has developed these stones.  She denies being dehydrated although she does work in a very hot environment that is sweaty.  She says she drinks water nonstop.  She is not sure if she is ever had excessive calcium intake.  After the stones were found she was discharged with an appointment to see Dr. Infante from the [facility], which she did virtually on 5/25.  He suggested she follow-up on the CT with a repeat CT 6/25/2021.  She has been monitoring her urine since.  Her symptoms have pretty much resolved but she does not believe she has passed all 3 stones.  At her ER visit she also had elevated white blood cell count with a left shift and elevated lactate but had normal lipase and LFTs.  She had elevated glucose at 165 and back in April 2019 she had an A1c elevated at 6.2%.  Her creatinine in the ER was 1.27.  We discussed rechecking labs.  She was also found on CT to have had a \"large stool burden\".  She was given oxycodone 5 mg on discharge as well as mag citrate and Zofran.  For a while she was having 3 BMs a day.  It has now become more regular.  She has had elevated blood pressures in the past, and in the ER she was in the 170s systolically, but today it normal.  She has also had elevated cholesterol numbers and she tells me she is taking red yeast rice.  She complains of ear fullness on the right.  She is not on board with doing preventative cancer screenings and is behind on mammogram, DEXA scan and colonoscopy.  She declines all of these, but she did get her COVID-19 " "vaccines.    OBJECTIVE: /86   Pulse 80   Ht 4' 11\" (1.499 m)   Wt 134 lb (60.8 kg)   SpO2 97%   Breastfeeding No   BMI 27.06 kg/m    General: Well-appearing mildly overweight  female in no acute distress  HEENT: Ears are clear bilaterally  Heart: Regular rate and rhythm  Lungs: Clear bilaterally  Abdomen: Soft  Extremities: Warm, dry and without edema      ASSESSMENT & PLAN:     1. Nephrolithiasis  No obvious cause other than dehydration or perhaps increased calcium intake for her kidney stones although she mentions her dad had had them.  Will monitor labs.  - HM1(CBC and Differential)  - Urinalysis Macroscopic    2. Hydronephrosis with ureteropelvic junction (UPJ) obstruction    3. Essential hypertension  Blood pressure appears to be back in the normal range although previous to this she has had marginal blood pressures and very high blood pressure in the ER.  - Basic Metabolic Panel    4. Hyperlipidemia, unspecified hyperlipidemia type  Diet controlled and using yeast rice extract.  Will monitor.  - Lipid Cascade RANDOM    5. Prediabetes  Elevated glucose in the ER at 165 and previous history of A1c of 6.2% in 4/2019.  Will monitor.  - Glycosylated Hemoglobin A1c    6. Screen for colon cancer  Refusing preventative screening measures for all cancers.    I will get back to her on her lab results and she is to follow-up with the kidney stone specialist.  Hopefully she will be able to find a replacement chiropractor as she mentions that she is losing her current chiropractor whom she has doctored with for a long time.  She can see me back in 6 months time or sooner as needed basis.    Patient Active Problem List   Diagnosis     Hand arthritis     Hyperlipidemia     Murmur, cardiac     Genital warts     Herpes simplex     Postcoital urinary tract infection     Impaired fasting glucose     Glaucoma     Essential hypertension     Prediabetes     Nephrolithiasis       Current Outpatient Medications on " File Prior to Visit   Medication Sig Dispense Refill     aspirin 81 MG EC tablet Take 81 mg by mouth daily.       cholecalciferol, vitamin D3, 5,000 unit Tab Take 5,000 Units by mouth as needed.              EVENING PRIMROSE OIL ORAL Take 1 tablet by mouth 2 (two) times a day.              FA/mv,Ca,iron,min/lycopene/lut (MULTIVITAL ORAL) Take 1 tablet by mouth daily.       magnesium citrate solution Take half bottle. If not bowel movement after 12 hours, please take the rest of the bottle 296 mL 0     OMEGA-3/DHA/EPA/FISH OIL (FISH OIL-OMEGA-3 FATTY ACIDS) 300-1,000 mg capsule Take 2 g by mouth daily.       RED YEAST RICE ORAL Take 1 tablet by mouth as needed.       RESTASIS 0.05 % ophthalmic emulsion INSTILL 1 DROP INTO BOTH EYES TWICE A DAY  11     timolol (BETIMOL) 0.5 % ophthalmic solution 1 drop 2 (two) times a day.       timoloL maleate (TIMOPTIC) 0.5 % ophthalmic solution        UNABLE TO FIND Med Name: Amberen 1300 mg Take 2 capsules by mouth daily       acyclovir (ZOVIRAX) 5 % ointment Apply twice to 5 times daily as needed 15 g 1     BIOTIN ORAL Take 1 tablet by mouth daily.              estradiol (ESTRACE) 0.01 % (0.1 mg/gram) vaginal cream Use sparingly topically twice a week. 42.5 g 3     nitrofurantoin (MACRODANTIN) 50 MG capsule Take 1 capsule (50 mg total) by mouth as needed. 30 capsule 0     No current facility-administered medications on file prior to visit.

## 2021-06-26 NOTE — PROGRESS NOTES
Assessment/Plan:    Assessment & Plan   Gala was seen today for met follow up.    Diagnoses and all orders for this visit:    Calculus of kidney  -     CT Abdomen Pelvis Without Oral Without IV Contrast; Future  -     Patient Stated Goal: Prevent further stones  -     Calcium Oxalate Stone Prevention Education    Stone Management Plan  [facility] 5/25/2021 6/16/2021   Urinary Tract Infection No suspicion of infection No suspicion of infection   Renal Colic Well controlled symptoms Asymptomatic at this time   Renal Failure No suspicion of renal failure No suspicion of renal failure   Current CT date 5/24/2021 6/15/2021   Right sided stones? Yes Yes   R Number of ureteral stones 1 No ureteral stones   R GSD of ureteral stones 5 -   R Location of ureteral stone Proximal -   R Number of kidney stones  No renal stones 1   R GSD of kidney stones - 2 - 4   R Hydronephrosis Mild None   R Stone Event New event Resolved event   Diagnosis date 5/24/2021 -   Initial location of primary symptomatic stone Proximal -   Initial GSD of primary symptomatic stone 5 -   Resolved date - 6/15/2021   R MET status Initiation No progression   R Current Plan MET Observe   MET (No Data) -   Observe rationale - Limited stone burden with good prognosis for spontaneous passage   Left sided stones? Yes Yes   L Number of ureteral stones No ureteral stones No ureteral stones   L Number of kidney stones  2 Renal stones unchanged from last exam   L GSD of kidney stones 2 - 4 2 - 4   L Hydronephrosis Mild None   L Stone Event No current event No current event   L Current Plan Observe Observe   Observe rationale Limited stone burden with good prognosis for spontaneous passage Limited stone burden with good prognosis for spontaneous passage         PLAN    65 yo F first time stone former with paternal familial history of kidney stones. Previous obstructing right proximal ureteral stone refluxed into lower pole calyx, hydronephrosis resolved.  Non-obstructing left renal stones.    She politely defers proceeding with stone risk evaluation at this time but may consider it pending insurance coverage. We reviewed dietary recommendations for stone prevention. Discussed options for stone management including surgical clearance versus observation, recommending the latter. Will place recall for return in 12 months or sooner if symptoms emerge.    Phone call duration: 35 minutes  Total visit: 42 minutes, spent on the date of the encounter doing chart review, history and exam, documentation and further activities per the note    Jada Orellana PA-C  [facility] [place] [facility]    Subjective:      HPI  Ms. Gala Nichloas is a 66 y.o. female who is being evaluated via a billable telephone visit by [facility] for medical expulsive therapy follow up.     On last encounter, her 4 mm stone was in right proximal ureter with mild hydronephrosis. She has had no unanticipated events.    Symptoms have well controlled. Last episode of right flank pain was late last week. She notes mild left back pain earlier today. She is asymptomatic at present. She denies current symptoms of fever, chills, flank pain, nausea, vomiting, urinary frequency and dysuria.     New CT scan was personally reviewed and demonstrates previous right proximal ureteral stone has refluxed into right lower pole calyx. No hydronephrosis. No change to small left renal stones.      ROS   Review of systems is negative except for HPI.    Past Medical History:   Diagnosis Date     H/O colposcopy with cervical biopsy      Post-menopausal        Past Surgical History:   Procedure Laterality Date     CERVICAL BIOPSY  W/ LOOP ELECTRODE EXCISION         Current Outpatient Medications   Medication Sig Dispense Refill     acyclovir (ZOVIRAX) 5 % ointment Apply twice to 5 times daily as needed 15 g 1     aspirin 81 MG EC tablet Take 81 mg by mouth daily.       BIOTIN ORAL  Take 1 tablet by mouth daily.              cholecalciferol, vitamin D3, 5,000 unit Tab Take 5,000 Units by mouth as needed.              estradiol (ESTRACE) 0.01 % (0.1 mg/gram) vaginal cream Use sparingly topically twice a week. 42.5 g 3     EVENING PRIMROSE OIL ORAL Take 1 tablet by mouth 2 (two) times a day.              FA/mv,Ca,iron,min/lycopene/lut (MULTIVITAL ORAL) Take 1 tablet by mouth daily.       magnesium citrate solution Take half bottle. If not bowel movement after 12 hours, please take the rest of the bottle 296 mL 0     nitrofurantoin (MACRODANTIN) 50 MG capsule Take 1 capsule (50 mg total) by mouth as needed. 30 capsule 0     OMEGA-3/DHA/EPA/FISH OIL (FISH OIL-OMEGA-3 FATTY ACIDS) 300-1,000 mg capsule Take 2 g by mouth daily.       RED YEAST RICE ORAL Take 1 tablet by mouth as needed.       RESTASIS 0.05 % ophthalmic emulsion INSTILL 1 DROP INTO BOTH EYES TWICE A DAY  11     timolol (BETIMOL) 0.5 % ophthalmic solution 1 drop 2 (two) times a day.       timoloL maleate (TIMOPTIC) 0.5 % ophthalmic solution        UNABLE TO FIND Med Name: Amberen 1300 mg Take 2 capsules by mouth daily       No current facility-administered medications for this visit.        Allergies   Allergen Reactions     Sulfa (Sulfonamide Antibiotics) Rash       Social History     Socioeconomic History     Marital status:      Spouse name: Shankar Aly     Number of children: 0     Years of education: 16     Highest education level: Not on file   Occupational History     Not on file   Social Needs     Financial resource strain: Not on file     Food insecurity     Worry: Not on file     Inability: Not on file     Transportation needs     Medical: Not on file     Non-medical: Not on file   Tobacco Use     Smoking status: Former Smoker     Quit date: 1984     Years since quittin.7     Smokeless tobacco: Never Used     Tobacco comment: light smoker 30 years ago    Substance and Sexual Activity     Alcohol use: Yes      Alcohol/week: 0.8 standard drinks     Types: 1 Standard drinks or equivalent per week     Drug use: No     Sexual activity: Yes     Partners: Male   Lifestyle     Physical activity     Days per week: Not on file     Minutes per session: Not on file     Stress: Not on file   Relationships     Social connections     Talks on phone: Not on file     Gets together: Not on file     Attends Advent service: Not on file     Active member of club or organization: Not on file     Attends meetings of clubs or organizations: Not on file     Relationship status: Not on file     Intimate partner violence     Fear of current or ex partner: Not on file     Emotionally abused: Not on file     Physically abused: Not on file     Forced sexual activity: Not on file   Other Topics Concern     Not on file   Social History Narrative     Not on file       Family History   Problem Relation Age of Onset     Diabetes Mother      Hypertension Father      Cancer Father      Heart disease Paternal Grandfather      Atrial fibrillation Paternal Grandfather      Stroke Paternal Grandmother      Atrial fibrillation Brother      Hypertension Maternal Grandmother        Objective:      No vitals or physical exam obtained due to virtual visit

## 2021-07-06 VITALS
SYSTOLIC BLOOD PRESSURE: 118 MMHG | HEART RATE: 80 BPM | DIASTOLIC BLOOD PRESSURE: 86 MMHG | BODY MASS INDEX: 27.01 KG/M2 | OXYGEN SATURATION: 97 % | WEIGHT: 134 LBS | HEIGHT: 59 IN

## 2021-07-06 VITALS — BODY MASS INDEX: 27.47 KG/M2 | WEIGHT: 136 LBS

## 2021-08-09 DIAGNOSIS — B00.1 RECURRENT COLD SORES: Primary | ICD-10-CM

## 2021-08-09 RX ORDER — ACYCLOVIR 50 MG/G
OINTMENT TOPICAL
Qty: 30 G | Refills: 0 | Status: SHIPPED | OUTPATIENT
Start: 2021-08-09 | End: 2022-05-23

## 2021-08-22 ENCOUNTER — HEALTH MAINTENANCE LETTER (OUTPATIENT)
Age: 66
End: 2021-08-22

## 2021-10-17 ENCOUNTER — HEALTH MAINTENANCE LETTER (OUTPATIENT)
Age: 66
End: 2021-10-17

## 2021-11-05 ENCOUNTER — IMMUNIZATION (OUTPATIENT)
Dept: NURSING | Facility: CLINIC | Age: 66
End: 2021-11-05
Payer: COMMERCIAL

## 2021-11-05 PROCEDURE — 0004A PR COVID VAC PFIZER DIL RECON 30 MCG/0.3 ML IM: CPT

## 2021-11-05 PROCEDURE — 91300 PR COVID VAC PFIZER DIL RECON 30 MCG/0.3 ML IM: CPT

## 2022-03-31 ENCOUNTER — TELEPHONE (OUTPATIENT)
Dept: FAMILY MEDICINE | Facility: CLINIC | Age: 67
End: 2022-03-31
Payer: COMMERCIAL

## 2022-03-31 NOTE — TELEPHONE ENCOUNTER
Patient Quality Outreach Summary      Summary:    Patient is due/failing the following:   Annual wellness, date due: last phy 2016    Type of outreach:    Phone, spoke to patient/parent. appt sched 4/25/22    Questions for provider review:    None                                                                                                                    Gen KP CMA      Chart routed to n/a.

## 2022-04-25 PROBLEM — R73.03 PREDIABETES: Chronic | Status: ACTIVE | Noted: 2018-01-25

## 2022-04-25 PROBLEM — N20.0 NEPHROLITHIASIS: Chronic | Status: ACTIVE | Noted: 2021-06-08

## 2022-05-02 ENCOUNTER — OFFICE VISIT (OUTPATIENT)
Dept: FAMILY MEDICINE | Facility: CLINIC | Age: 67
End: 2022-05-02
Payer: COMMERCIAL

## 2022-05-02 VITALS
HEART RATE: 80 BPM | WEIGHT: 131 LBS | SYSTOLIC BLOOD PRESSURE: 132 MMHG | DIASTOLIC BLOOD PRESSURE: 84 MMHG | HEIGHT: 58 IN | BODY MASS INDEX: 27.5 KG/M2

## 2022-05-02 DIAGNOSIS — K21.00 GASTROESOPHAGEAL REFLUX DISEASE WITH ESOPHAGITIS WITHOUT HEMORRHAGE: ICD-10-CM

## 2022-05-02 DIAGNOSIS — M51.369 DDD (DEGENERATIVE DISC DISEASE), LUMBAR: ICD-10-CM

## 2022-05-02 DIAGNOSIS — Z12.31 ENCOUNTER FOR SCREENING MAMMOGRAM FOR BREAST CANCER: ICD-10-CM

## 2022-05-02 DIAGNOSIS — R73.03 PREDIABETES: Chronic | ICD-10-CM

## 2022-05-02 DIAGNOSIS — N20.0 NEPHROLITHIASIS: Chronic | ICD-10-CM

## 2022-05-02 DIAGNOSIS — R10.13 ABDOMINAL PAIN, EPIGASTRIC: Primary | ICD-10-CM

## 2022-05-02 DIAGNOSIS — E78.2 MIXED HYPERLIPIDEMIA: Chronic | ICD-10-CM

## 2022-05-02 DIAGNOSIS — Z12.11 COLON CANCER SCREENING: ICD-10-CM

## 2022-05-02 LAB
CHOLEST SERPL-MCNC: 297 MG/DL
FASTING STATUS PATIENT QL REPORTED: YES
HDLC SERPL-MCNC: 85 MG/DL
LDLC SERPL CALC-MCNC: 174 MG/DL
TRIGL SERPL-MCNC: 188 MG/DL

## 2022-05-02 PROCEDURE — 99214 OFFICE O/P EST MOD 30 MIN: CPT | Performed by: FAMILY MEDICINE

## 2022-05-02 PROCEDURE — 80061 LIPID PANEL: CPT | Performed by: FAMILY MEDICINE

## 2022-05-02 PROCEDURE — 36415 COLL VENOUS BLD VENIPUNCTURE: CPT | Performed by: FAMILY MEDICINE

## 2022-05-13 NOTE — PROGRESS NOTES
"SUBJECTIVE: Gala Nicholas is a 67 year old  female who presents today with a complaint of abdominal pain on and off for the last 4 weeks.  She notes that her bowel movements are changing.  She usually has 3/day and that has decreased although she has 1 at least daily.  She equates it to her diet and to her chiropractic status.  She routinely goes to her chiropractor, at least before COVID hit.  She saw him on a regular basis but now tells me that he has retired and she is trying to find a replacement.  She notes that she is having more trouble with her low back.  She also notes that she has less rectal control and wonders if it is due to her back issues.  She also discusses some of her supplements etc. that she has taken in the past including digestive enzymes which she has used for her stomach pain in the past.  She also tells me her dad  of bile duct cancer.  She does not believe in Western medicine, at least not for her.  She has a history of nephrolithiasis, hyperlipidemia, and prediabetes.  She is behind on her preventative measures.    OBJECTIVE: /84   Pulse 80   Ht 1.48 m (4' 10.25\")   Wt 59.4 kg (131 lb)   BMI 27.14 kg/m    General: Well-appearing small in stature older female in no acute distress  Heart: Regular rate and rhythm without murmur  Lungs: Clear bilaterally  Abdomen: Soft, nontender  Extremities: Warm, dry and without edema      ASSESSMENT & PLAN:     Abdominal pain, epigastric  Equates this to her eating habits and perhaps her lack of digestive enzymes tablets which she is used in the past.  Discussed over the counter medications, supplements including.    Gastroesophageal reflux disease with esophagitis without hemorrhage  Discussed use of Tums, Pepcid if she needs.    DDD (degenerative disc disease), lumbar  Offered her a referral to the spine care clinic for further evaluation to see if they have anything to offer her.  She will consider this.    Mixed hyperlipidemia  Last " total cholesterol 282, .  Not willing to treat at this time but wants to recheck her labs.  - Lipid panel reflex to direct LDL Fasting  - Lipid panel reflex to direct LDL Fasting    Nephrolithiasis  Last June she was seen at [facility] [place].  Kidney stones were found to be calcium oxalate.  Seems to be doing better now.    Prediabetes  Last A1c 6.2%.  Her mother is diabetic and is not overweight.    Encounter for screening mammogram for breast cancer  Has never had a mammogram, discussed her need.  - *MA Screening Digital Bilateral    Colon cancer screening  Has not had colorectal cancer screening.  Will refer her.  - Adult Gastro Ref - Procedure Only      I will get back to her on her lab results via [facility] and only call with grossly abnormal values.  She is to call me back if she would like me to refer her to the spine care clinic.  She has had elevated blood pressure in the past and we discussed she really probably should see me back in 6 months.    Patient Active Problem List   Diagnosis     Hand arthritis     Hyperlipidemia     Murmur, cardiac     Genital warts     Herpes simplex     Postcoital urinary tract infection     Impaired fasting glucose     Glaucoma     Prediabetes     Nephrolithiasis       Current Outpatient Medications   Medication     aspirin (ASA) 81 MG EC tablet     acyclovir (ZOVIRAX) 5 % external ointment     No current facility-administered medications for this visit.

## 2022-05-23 ENCOUNTER — OFFICE VISIT (OUTPATIENT)
Dept: FAMILY MEDICINE | Facility: CLINIC | Age: 67
End: 2022-05-23
Payer: COMMERCIAL

## 2022-05-23 VITALS
RESPIRATION RATE: 18 BRPM | DIASTOLIC BLOOD PRESSURE: 82 MMHG | WEIGHT: 131 LBS | HEART RATE: 113 BPM | TEMPERATURE: 101.3 F | BODY MASS INDEX: 27.5 KG/M2 | SYSTOLIC BLOOD PRESSURE: 134 MMHG | HEIGHT: 58 IN | OXYGEN SATURATION: 96 %

## 2022-05-23 DIAGNOSIS — R50.9 FEVER, UNSPECIFIED FEVER CAUSE: Primary | ICD-10-CM

## 2022-05-23 DIAGNOSIS — B00.9 HERPES SIMPLEX: ICD-10-CM

## 2022-05-23 DIAGNOSIS — J20.9 ACUTE BRONCHITIS, UNSPECIFIED ORGANISM: ICD-10-CM

## 2022-05-23 DIAGNOSIS — U07.1 INFECTION DUE TO 2019 NOVEL CORONAVIRUS: ICD-10-CM

## 2022-05-23 DIAGNOSIS — E78.2 MIXED HYPERLIPIDEMIA: ICD-10-CM

## 2022-05-23 PROCEDURE — U0005 INFEC AGEN DETEC AMPLI PROBE: HCPCS | Performed by: FAMILY MEDICINE

## 2022-05-23 PROCEDURE — 99214 OFFICE O/P EST MOD 30 MIN: CPT | Performed by: FAMILY MEDICINE

## 2022-05-23 PROCEDURE — U0003 INFECTIOUS AGENT DETECTION BY NUCLEIC ACID (DNA OR RNA); SEVERE ACUTE RESPIRATORY SYNDROME CORONAVIRUS 2 (SARS-COV-2) (CORONAVIRUS DISEASE [COVID-19]), AMPLIFIED PROBE TECHNIQUE, MAKING USE OF HIGH THROUGHPUT TECHNOLOGIES AS DESCRIBED BY CMS-2020-01-R: HCPCS | Performed by: FAMILY MEDICINE

## 2022-05-23 RX ORDER — CHLORAL HYDRATE 500 MG
2 CAPSULE ORAL
COMMUNITY

## 2022-05-23 RX ORDER — CODEINE PHOSPHATE/GUAIFENESIN 10-100MG/5
10 LIQUID (ML) ORAL EVERY 4 HOURS PRN
Qty: 180 ML | Refills: 0 | Status: SHIPPED | OUTPATIENT
Start: 2022-05-23 | End: 2023-02-07

## 2022-05-23 RX ORDER — ONDANSETRON 4 MG/1
TABLET, ORALLY DISINTEGRATING ORAL
COMMUNITY
Start: 2021-05-24 | End: 2023-05-11

## 2022-05-23 RX ORDER — AZITHROMYCIN 250 MG/1
TABLET, FILM COATED ORAL
Qty: 6 TABLET | Refills: 0 | Status: SHIPPED | OUTPATIENT
Start: 2022-05-23 | End: 2022-05-28

## 2022-05-23 RX ORDER — MAGNESIUM CITRATE
SOLUTION, ORAL ORAL
COMMUNITY
Start: 2021-05-24 | End: 2023-05-11

## 2022-05-23 RX ORDER — TIMOLOL MALEATE 5 MG/ML
1 SOLUTION/ DROPS OPHTHALMIC
COMMUNITY
Start: 2021-06-07

## 2022-05-23 NOTE — PROGRESS NOTES
Fever  Temperature of 101.3  F currently.  I have high suspicion for COVID and so I am having her swab herself with a nasopharyngeal swab.  - Symptomatic; Yes; 5/20/2022 COVID-19 Virus (Coronavirus) by PCR Nose    Acute bronchitis, unspecified organism  Patient insists is just bronchitis and that she does not have COVID.  I will give her cough syrup with codeine so she can sleep at night and she is not to drive if taking it during the day.  I will also give her a Z-Shakir as she appears quite sick.  - azithromycin (ZITHROMAX) 250 MG tablet  Dispense: 6 tablet; Refill: 0  - guaiFENesin-codeine (GUAIFENESIN AC) 100-10 MG/5ML syrup  Dispense: 180 mL; Refill: 0    Mixed hyperlipidemia  Discussed her recent results from 5/2/2022 on cholesterol which is elevated with a total cholesterol of 297 and an LDL of 174.  Patient prefers to hold off on treatment as she is weary of Western medicine.    Herpes simplex  She has had a small flare of this.  She has acyclovir.    Infection due to 2019 novel coronavirus  Coronavirus testing came back the following day positive for COVID-19 and so the patient was called and I sent her a prescription for Paxlovid.  - nirmatrelvir and ritonavir (PAXLOVID) therapy pack  Dispense: 1 each; Refill: 0      She is to follow-up on an as-needed basis or in 11 months at her med check.      Ricardo Cedeño is a 67 year old who presents for the following health issues  accompanied by her none.    URI    History of Present Illness       Reason for visit:  Bronchitis  Symptom onset:  1-3 days ago    She eats 2-3 servings of fruits and vegetables daily.She consumes 2 sweetened beverage(s) daily.She exercises with enough effort to increase her heart rate 60 or more minutes per day.  She exercises with enough effort to increase her heart rate 5 days per week. She is missing 3 dose(s) of medications per week.  She is not taking prescribed medications regularly due to other.       Acute Illness  Acute  "illness concerns: possible bronchitis, was around brother-in-law who was sick who tested himself for COVID and was negative using a rapid test.  Onset/Duration: 5/20/22, started with scratchy throat on Friday, Saturday was quite ill with fever, body aches.  Sunday with croupy cough.  Symptoms:  Fever: YES  Chills/Sweats: YES  Headache (location?): no  Sinus Pressure: YES  Conjunctivitis:  no  Ear Pain: YES: left  Rhinorrhea: YES  Congestion: YES  Sore Throat: YES  Cough: YES-productive of yellow sputum, productive of green sputum, barking, worsening over time  Wheeze: no  Decreased Appetite: no  Nausea: no  Vomiting: no  Diarrhea: YES  Dysuria/Freq.: no  Dysuria or Hematuria: no  Fatigue/Achiness: YES  Sick/Strep Exposure: no  Therapies tried and outcome: None          Objective    /82 (BP Location: Right arm, Patient Position: Sitting, Cuff Size: Adult Regular)   Pulse 113   Temp (!) 101.3  F (38.5  C) (Oral)   Resp 18   Ht 1.48 m (4' 10.25\")   Wt 59.4 kg (131 lb)   LMP  (LMP Unknown)   SpO2 96%   Breastfeeding No   BMI 27.14 kg/m    Body mass index is 27.14 kg/m .  Physical Exam   GENERAL: Mild to moderately ill-appearing older female  EYES: Glassy eyes, no erythema  HENT: normal cephalic/atraumatic, nasal mucosa edematous , rhinorrhea clear, oral mucous membranes moist and throat mildly erythematous  RESP: no rales , no wheezes, bronchial breath sounds noted and intermittent productive cough  CV: regular rates and rhythm, normal S1 S2, no S3 or S4 and no peripheral edema  ABDOMEN: soft, nontender  MS: no gross musculoskeletal defects noted, no edema                "

## 2022-05-24 LAB — SARS-COV-2 RNA RESP QL NAA+PROBE: POSITIVE

## 2022-05-31 ENCOUNTER — OFFICE VISIT (OUTPATIENT)
Dept: FAMILY MEDICINE | Facility: CLINIC | Age: 67
End: 2022-05-31
Payer: COMMERCIAL

## 2022-05-31 VITALS
HEART RATE: 93 BPM | DIASTOLIC BLOOD PRESSURE: 80 MMHG | RESPIRATION RATE: 20 BRPM | BODY MASS INDEX: 27.14 KG/M2 | WEIGHT: 131 LBS | TEMPERATURE: 98.4 F | OXYGEN SATURATION: 96 % | SYSTOLIC BLOOD PRESSURE: 132 MMHG

## 2022-05-31 DIAGNOSIS — U07.1 INFECTION DUE TO 2019 NOVEL CORONAVIRUS: Primary | ICD-10-CM

## 2022-05-31 PROCEDURE — U0005 INFEC AGEN DETEC AMPLI PROBE: HCPCS | Performed by: FAMILY MEDICINE

## 2022-05-31 PROCEDURE — 99213 OFFICE O/P EST LOW 20 MIN: CPT | Performed by: FAMILY MEDICINE

## 2022-05-31 PROCEDURE — U0003 INFECTIOUS AGENT DETECTION BY NUCLEIC ACID (DNA OR RNA); SEVERE ACUTE RESPIRATORY SYNDROME CORONAVIRUS 2 (SARS-COV-2) (CORONAVIRUS DISEASE [COVID-19]), AMPLIFIED PROBE TECHNIQUE, MAKING USE OF HIGH THROUGHPUT TECHNOLOGIES AS DESCRIBED BY CMS-2020-01-R: HCPCS | Performed by: FAMILY MEDICINE

## 2022-05-31 ASSESSMENT — PAIN SCALES - GENERAL: PAINLEVEL: NO PAIN (0)

## 2022-05-31 NOTE — LETTER
May 31, 2022      RE: Gala Nicholas  [address] [facility] [address]         To whom it concerns,    My patient was seen in clinic on 5/23/2022 with COVID-19 symptoms. She tested positive that day.  She started with symptoms on May 21.  I had requested that she isolate from others for a 10-day period, from May 21 through 31. She was treated with antiviral medication during that time and has improved greatly.  Her coworkers at her work area have also tested positive for COVID 19 during this timeframe.  She was seen in clinic again today and has been retested for COVID-19 and results are pending but will be told to the patient as soon as possible.  She should be ready to go back to work on Wednesday, June 1.      Sincerely,          Yessica Nolan MD

## 2022-05-31 NOTE — LETTER
May 31, 2022      RE: Gala Nicholas  [address] [facility] [id number]         To whom it concerns,    My patient was seen in clinic on 5/23/2022 with COVID-19 symptoms. She tested positive that day.  She started with symptoms on May 21.  I had requested that she isolate from others for a 10-day period, from May 21 through 31. She was treated with antiviral medication during that time and has improved greatly.  Her coworkers at her work area have also tested positive for COVID 19 during this timeframe.  She was seen in clinic again today and has been retested for COVID-19 and results are pending but will be told to the patient as soon as possible.  She should be ready to go back to work on Wednesday, June 1.      Sincerely,          Yessica Nolan MD

## 2022-05-31 NOTE — LETTER
RE: Gala Nicholas  [address] [facility] [address] [facility] [address]         To whom it may concern,    My patient was seen in clinic on 5/23/2022 with COVID-19 symptoms. She tested positive that day.  She started with symptoms on May 21.  I had requested that she isolate from others for a 10-day period, from May 21 through 31. She was treated with antiviral medication during that time and has improved greatly.   She was seen in clinic again today and has been retested for COVID-19 and results are pending but will be told to the patient as soon as possible.  She should be ready to go back to work on Wednesday, June 1.      Sincerely,          Yessica Nieves

## 2022-06-01 LAB — SARS-COV-2 RNA RESP QL NAA+PROBE: NEGATIVE

## 2022-06-05 NOTE — PROGRESS NOTES
"SUBJECTIVE: Gala Nicholas is a 67 year old  female who presents today for follow-up on her coronavirus infection.  She has been away from work and will need a work note to return.  When I last saw her it was 5/23 and she came in with what she considered \"bronchitis\".  She had fever and body aches and was quite ill with a productive cough and lots of congestion.  She did not believe she had coronavirus but I tested her because she was willing.  It ended up being positive.  I had given her a Z-Shakir before the lab came back.  She is not a person who is much for Western medication, but did indeed want to be treated with Paxlovid.  She then did her own routine to make sure there congestion in her sinuses was frequently rinsed.  She did the [place] pot frequently, at least 3 times a day, and did salt water gargles.  She stayed away from dairy in order to decrease phlegm production.  She took boost immune zinc remedies and lots of different teas with probiotics etc.  She also did cough syrup with codeine at bedtime so she could sleep.    She reports she is feeling much better.  She still has a small amount of congestion and a very mild cough, but otherwise has no severe symptoms besides fatigue.  She tells me she should be able to return to work as today would be the 10th day since symptoms began.  She needs a note for work saying she can return.  She needs to be retested as well.    OBJECTIVE: /80   Pulse 93   Temp 98.4  F (36.9  C) (Oral)   Resp 20   Wt 59.4 kg (131 lb)   LMP  (LMP Unknown)   SpO2 96%   BMI 27.14 kg/m    General: Well-appearing normal weight older  female in no acute distress  HEENT: Eyes clear, nose without noticeable rhinorrhea  Heart: Regular rate and rhythm without murmur  Lungs: Clear bilaterally, no cough observed  Abdomen: Soft      ASSESSMENT & PLAN:     Infection due to 2019 novel coronavirus  She was quite sick but she has done quite well on the antiviral medicine.  She " believes the Z-Shakir helped her as well.    I will recheck her for the virus and write a note for her to return to work.  - Asymptomatic COVID-19 Virus (Coronavirus) by PCR Nose  -Return to work note written.    She can see me back at her usual interval.    Patient Active Problem List   Diagnosis     Hand arthritis     Hyperlipidemia     Murmur, cardiac     Genital warts     Herpes simplex     Postcoital urinary tract infection     Impaired fasting glucose     Glaucoma     Prediabetes     Nephrolithiasis       Current Outpatient Medications   Medication     aspirin (ASA) 81 MG EC tablet     guaiFENesin-codeine (GUAIFENESIN AC) 100-10 MG/5ML syrup     Magnesium Citrate (CITRATE OF MAGNESIA) SOLN     Omega-3 1000 MG capsule     ondansetron (ZOFRAN ODT) 4 MG ODT tab     timolol maleate (TIMOPTIC) 0.5 % ophthalmic solution     No current facility-administered medications for this visit.

## 2022-08-03 ENCOUNTER — IMMUNIZATION (OUTPATIENT)
Dept: NURSING | Facility: CLINIC | Age: 67
End: 2022-08-03
Payer: COMMERCIAL

## 2022-08-03 PROCEDURE — 91305 COVID-19,PF,PFIZER (12+ YRS): CPT

## 2022-08-03 PROCEDURE — 0054A COVID-19,PF,PFIZER (12+ YRS): CPT

## 2022-10-02 ENCOUNTER — HEALTH MAINTENANCE LETTER (OUTPATIENT)
Age: 67
End: 2022-10-02

## 2022-11-07 ENCOUNTER — IMMUNIZATION (OUTPATIENT)
Dept: NURSING | Facility: CLINIC | Age: 67
End: 2022-11-07
Payer: COMMERCIAL

## 2022-11-07 PROCEDURE — 0124A COVID-19,PF,PFIZER BOOSTER BIVALENT: CPT

## 2022-11-07 PROCEDURE — 91312 COVID-19,PF,PFIZER BOOSTER BIVALENT: CPT

## 2023-01-20 ENCOUNTER — OFFICE VISIT (OUTPATIENT)
Dept: FAMILY MEDICINE | Facility: CLINIC | Age: 68
End: 2023-01-20
Payer: COMMERCIAL

## 2023-01-20 VITALS
HEART RATE: 91 BPM | TEMPERATURE: 97.9 F | OXYGEN SATURATION: 96 % | DIASTOLIC BLOOD PRESSURE: 90 MMHG | RESPIRATION RATE: 20 BRPM | SYSTOLIC BLOOD PRESSURE: 153 MMHG

## 2023-01-20 DIAGNOSIS — J11.1 INFLUENZA WITH RESPIRATORY MANIFESTATION OTHER THAN PNEUMONIA: Primary | ICD-10-CM

## 2023-01-20 DIAGNOSIS — H65.02 NON-RECURRENT ACUTE SEROUS OTITIS MEDIA OF LEFT EAR: ICD-10-CM

## 2023-01-20 PROCEDURE — 99213 OFFICE O/P EST LOW 20 MIN: CPT | Performed by: STUDENT IN AN ORGANIZED HEALTH CARE EDUCATION/TRAINING PROGRAM

## 2023-01-20 RX ORDER — AMOXICILLIN 500 MG/1
500 TABLET, FILM COATED ORAL 2 TIMES DAILY
Qty: 14 TABLET | Refills: 0 | Status: SHIPPED | OUTPATIENT
Start: 2023-01-20 | End: 2023-01-27

## 2023-01-20 RX ORDER — OSELTAMIVIR PHOSPHATE 75 MG/1
75 CAPSULE ORAL 2 TIMES DAILY
Qty: 10 CAPSULE | Refills: 0 | Status: SHIPPED | OUTPATIENT
Start: 2023-01-20 | End: 2023-01-25

## 2023-01-20 NOTE — PROGRESS NOTES
Assessment & Plan     Influenza with respiratory manifestation other than pneumonia  Vital signs are significant for elevated blood pressure, otherwise afebrile and other vital signs are within normal limits.  Her symptoms are likely due to a viral upper respiratory infection, given her symptomatic history and influenza exposure we will treat her empirically with Tamiflu.  Encouraged her to continue p.o. intake hydration, Tylenol and ibuprofen as needed for symptom relief and myalgias.  - oseltamivir (TAMIFLU) 75 MG capsule; Take 1 capsule (75 mg) by mouth 2 times daily for 5 days    Non-recurrent acute serous otitis media of left ear  On otoscope exam patient has mucus effusion behind the left tympanic membrane with TM erythema.  We will treat with amoxicillin for 7 days.  - amoxicillin (AMOXIL) 500 MG tablet; Take 1 tablet (500 mg) by mouth 2 times daily for 7 days    Return if symptoms worsen or fail to improve.    Ricki Rivers MD  [facility]    Ricardo Cedeño is a 67 year old, presenting for the following health issues:  Covid 19 Testing (Home test is negative  today), Generalized Body Aches (chills), Headache, Ear Problem (Pain for 6 days ), Nasal Congestion, Cough, and Sick (3 days )    Patient Active Problem List   Diagnosis     Hand arthritis     Hyperlipidemia     Murmur, cardiac     Genital warts     Herpes simplex     Postcoital urinary tract infection     Impaired fasting glucose     Glaucoma     Prediabetes     Nephrolithiasis     Current Outpatient Medications   Medication     amoxicillin (AMOXIL) 500 MG tablet     aspirin (ASA) 81 MG EC tablet     oseltamivir (TAMIFLU) 75 MG capsule     guaiFENesin-codeine (GUAIFENESIN AC) 100-10 MG/5ML syrup     Magnesium Citrate (CITRATE OF MAGNESIA) SOLN     Omega-3 1000 MG capsule     ondansetron (ZOFRAN ODT) 4 MG ODT tab     timolol maleate (TIMOPTIC) 0.5 % ophthalmic solution     No current facility-administered  medications for this visit.     HPI     Patient presents with cough, runny nose, sore throat, hoarse voice, body aches, body chills, ear pain on the left ear.  Symptoms started earlier this week.  [place] [employer] [place]. Exercises extra precaution because she takes care of her 90 year old mother. Measured temperatures at home around 98F.  She is fully vaccinated for COVID, influenza.  She thinks her symptoms are likely due to influenza, this was treated in 2020 with Tamiflu and she felt significant symptomatic improvement.    Review of Systems   Constitutional, HEENT, cardiovascular, pulmonary, gi and gu systems are negative, except as otherwise noted.      Objective    BP (!) 153/90   Pulse 91   Temp 97.9  F (36.6  C)   Resp 20   LMP  (LMP Unknown)   SpO2 96%   There is no height or weight on file to calculate BMI.  Physical Exam   GENERAL: healthy, alert and no distress  EYES: Eyes grossly normal to inspection, PERRL and conjunctivae and sclerae normal  HENT: ear canals and left TM with purulent effusion and erythema, right TM with clear effusion, mild posterior pharyngeal erythema and postnasal drip,  NECK: no adenopathy, no asymmetry, masses, or scars and thyroid normal to palpation  RESP: lungs clear to auscultation - no rales, rhonchi or wheezes  CV: regular rate and rhythm, normal S1 S2, no S3 or S4, no murmur, click or rub, no peripheral edema and peripheral pulses strong  ABDOMEN: soft, nontender, no hepatosplenomegaly, no masses and bowel sounds normal  MS: no gross musculoskeletal defects noted, no edema  SKIN: no suspicious lesions or rashes  NEURO: Normal strength and tone, mentation intact and speech normal  PSYCH: mentation appears normal, affect normal/bright

## 2023-01-21 ENCOUNTER — OFFICE VISIT (OUTPATIENT)
Dept: FAMILY MEDICINE | Facility: CLINIC | Age: 68
End: 2023-01-21
Payer: COMMERCIAL

## 2023-01-21 VITALS
HEART RATE: 93 BPM | WEIGHT: 131 LBS | SYSTOLIC BLOOD PRESSURE: 159 MMHG | OXYGEN SATURATION: 96 % | DIASTOLIC BLOOD PRESSURE: 92 MMHG | BODY MASS INDEX: 27.14 KG/M2 | RESPIRATION RATE: 18 BRPM | TEMPERATURE: 98.3 F

## 2023-01-21 DIAGNOSIS — J20.9 ACUTE BRONCHITIS, UNSPECIFIED ORGANISM: Primary | ICD-10-CM

## 2023-01-21 PROCEDURE — 99213 OFFICE O/P EST LOW 20 MIN: CPT | Performed by: FAMILY MEDICINE

## 2023-01-21 RX ORDER — AZITHROMYCIN 250 MG/1
TABLET, FILM COATED ORAL
Qty: 6 TABLET | Refills: 0 | Status: SHIPPED | OUTPATIENT
Start: 2023-01-21 | End: 2023-01-26

## 2023-01-21 RX ORDER — CODEINE PHOSPHATE AND GUAIFENESIN 10; 100 MG/5ML; MG/5ML
1-2 SOLUTION ORAL EVERY 4 HOURS PRN
Qty: 118 ML | Refills: 0 | Status: SHIPPED | OUTPATIENT
Start: 2023-01-21

## 2023-01-21 NOTE — PROGRESS NOTES
Assessment & Plan     Acute bronchitis, unspecified organism  Would like to switch to zpak. Codeine cough syrup  - azithromycin (ZITHROMAX) 250 MG tablet  Dispense: 6 tablet; Refill: 0  - guaiFENesin-codeine (ROBITUSSIN AC) 100-10 MG/5ML solution  Dispense: 118 mL; Refill: 0             No follow-ups on file.    Truong Orellana MD  [facility]    Ricardo Cedeño is a 67 year old female who presents to clinic today for the following health issues:  Chief Complaint   Patient presents with     URI     cough     HPI    Here with concern about color of phlegm.  Has green mucous and tightness in bronchial area.  Wants the zpak        Review of Systems        Objective    BP (!) 159/92   Pulse 93   Temp 98.3  F (36.8  C) (Oral)   Resp 18   Wt 59.4 kg (131 lb)   LMP  (LMP Unknown)   SpO2 96%   BMI 27.14 kg/m    Physical Exam  Vitals and nursing note reviewed.   Constitutional:       Appearance: Normal appearance.   Neurological:      Mental Status: She is alert.

## 2023-01-31 ENCOUNTER — ANCILLARY PROCEDURE (OUTPATIENT)
Dept: GENERAL RADIOLOGY | Facility: CLINIC | Age: 68
End: 2023-01-31
Attending: FAMILY MEDICINE
Payer: COMMERCIAL

## 2023-01-31 ENCOUNTER — OFFICE VISIT (OUTPATIENT)
Dept: FAMILY MEDICINE | Facility: CLINIC | Age: 68
End: 2023-01-31
Payer: COMMERCIAL

## 2023-01-31 VITALS
WEIGHT: 131 LBS | HEIGHT: 58 IN | DIASTOLIC BLOOD PRESSURE: 93 MMHG | OXYGEN SATURATION: 99 % | TEMPERATURE: 97.8 F | BODY MASS INDEX: 27.5 KG/M2 | HEART RATE: 88 BPM | RESPIRATION RATE: 16 BRPM | SYSTOLIC BLOOD PRESSURE: 165 MMHG

## 2023-01-31 DIAGNOSIS — J06.9 VIRAL UPPER RESPIRATORY TRACT INFECTION: ICD-10-CM

## 2023-01-31 DIAGNOSIS — J06.9 VIRAL UPPER RESPIRATORY TRACT INFECTION: Primary | ICD-10-CM

## 2023-01-31 DIAGNOSIS — J15.9 BACTERIAL PNEUMONIA: ICD-10-CM

## 2023-01-31 PROCEDURE — 99214 OFFICE O/P EST MOD 30 MIN: CPT | Mod: GC | Performed by: STUDENT IN AN ORGANIZED HEALTH CARE EDUCATION/TRAINING PROGRAM

## 2023-01-31 PROCEDURE — 71046 X-RAY EXAM CHEST 2 VIEWS: CPT | Mod: TC | Performed by: RADIOLOGY

## 2023-01-31 NOTE — PROGRESS NOTES
Assessment & Plan     Bacterial pneumonia  Vital signs with essential pretension 165/93, all other vital signs are within normal limits.  Patient has had 10-day duration of respiratory symptoms including cough, productive phlegm.  Concern for superimposed bacterial pneumonia, and/or bacterial sinus infection.  Plain film of the chest mostly normal, may be a slight degree of lung field infiltrate on the bases.  We will treat with a 7-day course of Augmentin and then reassess for any further developing symptoms.  - amoxicillin-clavulanate (AUGMENTIN) 875-125 MG tablet; Take 1 tablet by mouth 2 times daily for 7 days    Viral upper respiratory tract infection  - XR CHEST 2 VW; Future    Return if symptoms worsen or fail to improve.    Ricki Rivers MD  [facility]    Ricardo Cedeño is a 67 year old{, presenting for the following health issues:  Other (F/U from last visit. To make sure left analia is clear and lung is clear. Got more sick )      HPI     Acute Illness  Acute illness concerns: upper respiratory symptoms starting since 1/20  Onset/Duration: 11 days  Symptoms:  Fever: YES  Chills/Sweats: YES  Headache (location?): YES  Sinus Pressure: YES  Conjunctivitis:  No  Ear Pain: no  Rhinorrhea: YES  Congestion: YES  Sore Throat: YES  Cough: YES-productive of green sputum  Wheeze: YES  Decreased Appetite: N/A  Nausea: N/A  Vomiting: N/A  Diarrhea: N/A  Dysuria/Freq.: N/A  Dysuria or Hematuria: N/A  Fatigue/Achiness: YES  Sick/Strep Exposure: No  Therapies tried and outcome: Tamiflu and Azithromycin    Was seen on 1/20/23 and 1/21/23 for acute bronchitis and was treated with azithromycin and noticed that her discharge was increased and colored green.  Usually gets better within 2 days with antibiotics but feels worse, by 1/22 doing hot liquids, lorenzo products, taking antibiotics. Taking Mucinex for congestion.    Review of Systems   Constitutional, HEENT, cardiovascular, pulmonary, gi  "and gu systems are negative, except as otherwise noted.      Objective    BP (!) 165/93   Pulse 88   Temp 97.8  F (36.6  C) (Oral)   Resp 16   Ht 1.477 m (4' 10.15\")   Wt 59.4 kg (131 lb)   LMP  (LMP Unknown)   SpO2 99%   BMI 27.24 kg/m    Body mass index is 27.24 kg/m .  Physical Exam   GENERAL: healthy, alert and no distress  EYES: Eyes grossly normal to inspection, PERRL and conjunctivae and sclerae normal  HENT: ear canals and TM's normal, nose and mouth without ulcers or lesions  NECK: no adenopathy, no asymmetry, masses, or scars and thyroid normal to palpation  RESP: faintest crackles on exam on the lung bases  CV: regular rate and rhythm, normal S1 S2, no S3 or S4, no murmur, click or rub, no peripheral edema and peripheral pulses strong  ABDOMEN: soft, nontender, no hepatosplenomegaly, no masses and bowel sounds normal  MS: no gross musculoskeletal defects noted, no edema  SKIN: no suspicious lesions or rashes  NEURO: Normal strength and tone, mentation intact and speech normal  PSYCH: mentation appears normal, affect normal/bright    Results for orders placed or performed in visit on 01/31/23   XR CHEST 2 VW     Status: None    Narrative    EXAM: XR CHEST 2 VIEWS  LOCATION: [facility]  DATE/TIME: 1/31/2023 2:15 PM    INDICATION:  Viral upper respiratory tract infection  COMPARISON: 04/26/2019      Impression    IMPRESSION: Negative chest.               "

## 2023-01-31 NOTE — LETTER
RETURN TO WORK/SCHOOL FORM    1/31/2023    Re: Gala Nicholas  1955      To Whom It May Concern:     Gala Nicholas contacted the clinic today and reported illness.  She may return to work without restrictions on 2/1/23. Please excuse her from work over the weekend from 1/28-1/31 for illness.        Ricki Rivers MD  1/31/2023 2:27 PM

## 2023-01-31 NOTE — LETTER
RETURN TO WORK/SCHOOL FORM    1/31/2023    Re: Gala Nicholas  1955      To Whom It May Concern:     Gala Nicholas contacted the clinic today and reported illness..  She may return to work without restrictions on 2/1/23. Please excuse her from 1/20/23 to 1/31/23 for illness.      Ricki Rivers MD  1/31/2023 2:29 PM

## 2023-01-31 NOTE — PROGRESS NOTES
Preceptor Attestation:    I discussed the patient with the resident and evaluated the patient in person. I have verified the content of the note, which accurately reflects my assessment of the patient and the plan of care.   Supervising Physician:  Gilbert Ballesteros MD.

## 2023-02-07 ENCOUNTER — OFFICE VISIT (OUTPATIENT)
Dept: FAMILY MEDICINE | Facility: CLINIC | Age: 68
End: 2023-02-07
Payer: COMMERCIAL

## 2023-02-07 VITALS
WEIGHT: 131 LBS | RESPIRATION RATE: 16 BRPM | HEART RATE: 80 BPM | SYSTOLIC BLOOD PRESSURE: 148 MMHG | BODY MASS INDEX: 27.5 KG/M2 | OXYGEN SATURATION: 96 % | HEIGHT: 58 IN | TEMPERATURE: 98.1 F | DIASTOLIC BLOOD PRESSURE: 90 MMHG

## 2023-02-07 DIAGNOSIS — Z12.11 SCREEN FOR COLON CANCER: ICD-10-CM

## 2023-02-07 DIAGNOSIS — R73.03 PREDIABETES: Chronic | ICD-10-CM

## 2023-02-07 DIAGNOSIS — Z12.11 COLON CANCER SCREENING: ICD-10-CM

## 2023-02-07 DIAGNOSIS — Z11.59 NEED FOR HEPATITIS C SCREENING TEST: ICD-10-CM

## 2023-02-07 DIAGNOSIS — N20.0 NEPHROLITHIASIS: Chronic | ICD-10-CM

## 2023-02-07 DIAGNOSIS — J06.9 VIRAL UPPER RESPIRATORY ILLNESS: Primary | ICD-10-CM

## 2023-02-07 DIAGNOSIS — R03.0 ELEVATED BLOOD PRESSURE READING WITHOUT DIAGNOSIS OF HYPERTENSION: ICD-10-CM

## 2023-02-07 DIAGNOSIS — E78.2 MIXED HYPERLIPIDEMIA: Chronic | ICD-10-CM

## 2023-02-07 DIAGNOSIS — H40.9 GLAUCOMA, UNSPECIFIED GLAUCOMA TYPE, UNSPECIFIED LATERALITY: ICD-10-CM

## 2023-02-07 PROCEDURE — 99215 OFFICE O/P EST HI 40 MIN: CPT | Performed by: FAMILY MEDICINE

## 2023-02-07 NOTE — PROGRESS NOTES
Viral upper respiratory illness  Her viral illness could very well have been COVID or flu and as far as I can tell these were never officially checked.  The chest x-ray also was read as clear.  She had a lot of antibiotics but perhaps did not need 3 rounds.  I will check to make sure her blood counts are normal.  - CBC with platelets and differential    Elevated blood pressure reading without diagnosis of hypertension  She was sick and so she could have had elevated blood pressure due to treatments and or just due to the illness.  Today her blood pressure is still elevated at 148/90 but patient wants to hold off on treatment as she believes because she is living healthy, that she should be able to avoid hypertension.  She wants to give her body a bit more time to recover from her illness.    Mixed hyperlipidemia  I would like to recheck her cholesterol numbers as they were elevated with a total cholesterol of 297 and LDL of 174 at last check.  - Lipid panel reflex to direct LDL Fasting    Prediabetes  Patient's last A1c was 6.2%.  This I would like to recheck as well.  - Hemoglobin A1c    Nephrolithiasis  Has not been problematic recently.  - Comprehensive metabolic panel (BMP + Alb, Alk Phos, ALT, AST, Total. Bili, TP)    Glaucoma, unspecified glaucoma type, unspecified laterality  Has eyedrops.    Colon cancer screening  - Colonoscopy Screening  Referral    Need for hepatitis C screening test  - Hepatitis C Screen Reflex to HCV RNA Quant and Genotype    She prefers not to get her blood drawn right now but promises to come back and have the labs drawn.  She will put off treating her blood pressure for now.  We discussed getting her mammogram and colonoscopy done.  She can follow-up with me in 3 months.  She can get her labs drawn prior so that we can talk about results at that visit.    50 minutes spent in preparation, with the patient during the visit and in documentation on day of  service.      Ricardo Cedeño is a 67 year old, presenting for the following health issues:  Blood Pressure Check and Hand Pain (Finger/ kunckle pain on middle finger of right hand started last week)      History of Present Illness       Hypertension: She presents for follow up of hypertension.  She does not check blood pressure  regularly outside of the clinic. Outside blood pressures have been over 140/90. She follows a low salt diet.    This patient has had a series of relatively recent visits with an upper respiratory infection at the walk-in clinic.  She had a horrible cough and congestion.  She was using Mucinex.  At her first visit on 1/20 she was given Tamiflu because of exposure to influenza and amoxicillin for left ear effusion.  The following day she was seen again because her cough got worse and more productive of ugly phlegm and the amoxicillin was changed to a Z-Shakir and she was given cough syrup with codeine.  However she continued being sick and miserable and needed to be seen again in the [place] clinic on 1/31/2023 and had a chest x-ray.  The x-rays were mostly clear except for some atelectasis versus infiltrate at the bases and so was given a 7-day course of Augmentin.  During these visits she was found to have elevated blood pressure as high as 165/93.  Today her blood pressure is elevated at 148/90.  In past visits she has been creeping towards high blood pressure.  She tells me that in May she had COVID and thinks perhaps she has long COVID because she has been experiencing fatigue, insomnia and daytime somnolence.  It could be obstructive sleep apnea due to her congestion even.  We also discussed that she has had elevated cholesterol results as well when she was seen in May.  She is already prediabetic with A1c of 6.2%.  She has a mother with diabetes who is also normal weight.  She has had glaucoma but is not using drops apparently.  I think perhaps she was borderline.  She has a history  "of kidney stones as well but as long as she stays hydrated she does okay.  She is far behind on her preventative measures and is in need of colon cancer screening, mammogram, and bone density.          Objective    BP (!) 148/90   Pulse 80   Temp 98.1  F (36.7  C) (Oral)   Resp 16   Ht 1.473 m (4' 10\")   Wt 59.4 kg (131 lb)   LMP  (LMP Unknown)   SpO2 96%   BMI 27.38 kg/m    Body mass index is 27.38 kg/m .  Physical Exam   GENERAL: healthy, alert and no distress  HEENT: Eyes clear, nose without noticeable rhinorrhea  RESP: lungs clear to auscultation - no rales, rhonchi or wheezes  CV: regular rates and rhythm and without murmur  MS: no gross musculoskeletal defects noted, no edema            "

## 2023-05-11 ENCOUNTER — OFFICE VISIT (OUTPATIENT)
Dept: FAMILY MEDICINE | Facility: CLINIC | Age: 68
End: 2023-05-11
Payer: COMMERCIAL

## 2023-05-11 VITALS
BODY MASS INDEX: 26.35 KG/M2 | DIASTOLIC BLOOD PRESSURE: 74 MMHG | SYSTOLIC BLOOD PRESSURE: 138 MMHG | OXYGEN SATURATION: 97 % | TEMPERATURE: 98.1 F | HEIGHT: 59 IN | RESPIRATION RATE: 16 BRPM | WEIGHT: 130.7 LBS | HEART RATE: 82 BPM

## 2023-05-11 DIAGNOSIS — E78.2 MIXED HYPERLIPIDEMIA: Primary | Chronic | ICD-10-CM

## 2023-05-11 DIAGNOSIS — Z00.00 ENCOUNTER FOR SCREENING AND PREVENTATIVE CARE: ICD-10-CM

## 2023-05-11 DIAGNOSIS — N20.0 NEPHROLITHIASIS: Chronic | ICD-10-CM

## 2023-05-11 DIAGNOSIS — H40.9 GLAUCOMA, UNSPECIFIED GLAUCOMA TYPE, UNSPECIFIED LATERALITY: ICD-10-CM

## 2023-05-11 DIAGNOSIS — R73.03 PREDIABETES: Chronic | ICD-10-CM

## 2023-05-11 DIAGNOSIS — Z11.59 ENCOUNTER FOR HEPATITIS C SCREENING TEST FOR LOW RISK PATIENT: ICD-10-CM

## 2023-05-11 DIAGNOSIS — M19.041 PRIMARY OSTEOARTHRITIS OF RIGHT HAND: ICD-10-CM

## 2023-05-11 PROCEDURE — 99215 OFFICE O/P EST HI 40 MIN: CPT | Performed by: FAMILY MEDICINE

## 2023-05-11 ASSESSMENT — PAIN SCALES - GENERAL: PAINLEVEL: SEVERE PAIN (6)

## 2023-05-11 NOTE — PROGRESS NOTES
Mixed hyperlipidemia  Patient's last lab was on 5/2/2022 and she had a total cholesterol of 297 with an HDL of 85 and an LDL of 174.  Patient has resisted medication to treat her high cholesterol.    Prediabetes  Patient's last A1c was 6.2% on 6/8/2021 and she has actually had that A1c for her 5 years now.    Nephrolithiasis  Has not had any recent issues luckily.  Tries to stay hydrated.    Glaucoma, unspecified glaucoma type, unspecified laterality  Does have timolol drops from the eye doctor that she uses.    Primary osteoarthritis of right hand  Patient is having worsening third DIP joint pain and disfigurement.  Likely secondary to her active job at the [employer].    Encounter for hepatitis C screening test for low risk patient  Has not been screened and so needs to be.    Encounter for screening and preventative care  Patient is very behind on her preventative screenings including her colorectal screening which should be a colonoscopy, mammogram which should be a digital and possibly 3D mammogram, bone density, CT of the abdomen and pelvis for her kidney stones and of course her lab work which I ordered including A1c, CMP, lipid, CBC, hep C.    Orders have been faxed to [facility] for labs and to another facility for a place [facility] for a CT stone run, mammogram and DEXA scan.  Orders were printed off and faxed to these facilities.  Hopefully they will get back to me on these results so I can get back to the patient.  She should be seen back in no longer than 6 months time as I feel we need to watch her blood pressure.    50 minutes spent in review of the chart, with the patient during our visit and in documentation of the visit, drawing up of orders and figuring out how to fax the orders to the correct places.    Ricardo Cedeño is a 67 year old, presenting for the following health issues:  Blood Draw (Pt was looking for an insurance card to order a specific test. Pt found the card and would  like to do the blood test now. Needs to go through [facility]), Arthritis (Arthritis in fingers has gotten worse. Is unable to grab things and is very painful. ), Ear Problem (Left ear problem. Pt states it feels like a flap in her ear where if she lays down she feels her ear gets plugged. ), Chest tightness (Pt thinks this is due to allergies and having covid last year. Pt had an inhaler but it is  and would like a new one. ), and Musculoskeletal Problem (Pain on her right hip. Pt states it hurts when she pushes on the muscle. )        2023     3:16 PM   Additional Questions   Roomed by Yasmine Arreaga   Accompanied by - Shankar     Arthritis    History of Present Illness       Reason for visit:  Arthritis & menopause  Symptom onset:  More than a month  Symptoms include:  Hand joint  Symptom intensity:  Moderate  Symptom progression:  Worsening  Had these symptoms before:  No    She eats 2-3 servings of fruits and vegetables daily.She consumes 1 sweetened beverage(s) daily.She exercises with enough effort to increase her heart rate 60 or more minutes per day.  She exercises with enough effort to increase her heart rate 4 days per week.   She is taking medications regularly.     This patient is complaining of slow onset of the third DIP joint.  She works at the [employer] and is using her hands a lot.  She is noticing more osteoarthritis and pain as she ages.  She has had elevated blood pressure readings which I am concerned about.  She tells me she is keeping track of it and does not believe it is consistently elevated.  She has hyperlipidemia which is known but not treated as she is weary of statins.  She is also prediabetic and has strong family history for diabetes as her mom has been diabetic for many years and also is not overweight.  She has a history of kidney stones and they are watching left-sided glaucoma.  She has governmental insurance with some type of special offering for free or  "low-cost screening with labs and studies.  She needs to send any orders to [employer] and she had brought me information on how to use her benefit.  I will have to write up letters and have them faxed along with the orders I want and the codes for them.  She is quite behind on her preventative measures including labs, mammogram, colorectal screening and she declines some vaccinations.  She is still working and thus is not on Medicare.      Review of Systems   Musculoskeletal: Positive for arthritis.         Objective    /74 (BP Location: Left arm, Patient Position: Sitting, Cuff Size: Adult Regular)   Pulse 82   Temp 98.1  F (36.7  C) (Oral)   Resp 16   Ht 1.492 m (4' 10.75\")   Wt 59.3 kg (130 lb 11.2 oz)   LMP  (LMP Unknown)   SpO2 97%   Breastfeeding No   BMI 26.62 kg/m    Body mass index is 26.62 kg/m .  Physical Exam   GENERAL: healthy, alert and no distress  RESP: lungs clear to auscultation - no rales, rhonchi or wheezes  CV: regular rates and rhythm  ABDOMEN: soft, nontender  MS: no gross musculoskeletal defects noted, no edema                "

## 2023-05-11 NOTE — LETTER
May 16, 2023    Re:  Gala Nicholas  [address] [address]        Dear [facility]AGNER ,    Please perform the following study:     CT of abdomen and pelvis (stone run)-  N20.0    Please fax results back to me at [phone].      Sincerely,            Yessica Nolan MD

## 2023-05-11 NOTE — LETTER
May 11, 2023      Re:  Gala Nicholas  [address]  [place] [address]        Dear WAGNER,     Please perform the following studies:     Screening digital bilateral mammogram - Z12.31     DEXA hip/pelvis/spine -Z12.31           Thank You,            Yessica Nolan MD

## 2023-05-11 NOTE — LETTER
May 11, 2023      Gala Nicholas  [address] [address]        Dear [facility],     Please draw the following blood tests:   A1c -  R73.03   CMP-comprehensive metabolic panel - E78.2   Fasting lipids - E78.2   CBC with differential - R79.89   Screening hepatitis C with reflex to HCV RNA quant and genotype - Z11.59    Please fax back results to me at [phone]      Thank You,            Yessica Nolan MD

## 2023-05-16 ENCOUNTER — TELEPHONE (OUTPATIENT)
Dept: UROLOGY | Facility: CLINIC | Age: 68
End: 2023-05-16
Payer: COMMERCIAL

## 2023-05-16 NOTE — TELEPHONE ENCOUNTER
Health Call Center    Phone Message    May a detailed message be left on voicemail: no     Reason for Call: Symptoms or Concerns     If patient has red-flag symptoms, warm transfer to triage line    Current symptom or concern: The patient called with concerns of left flank pain. Writer offered to schedule appointment. The patient declined. She would like to speak with Marzena. She is wondering if she needs to go to Urgent Care. Please contact patient. Thank you.    Symptoms have been present for:  1 day(s)    Has patient previously been seen for this? Yes    By: Flip    Date: 6/16/21    Are there any new or worsening symptoms? No      Action Taken: Message routed to:  Other: [facility]    Travel Screening: Not Applicable

## 2023-05-16 NOTE — TELEPHONE ENCOUNTER
Spoke to patient regarding symptoms.  Last CT scan with [facility] was 2 years ago.  Patient is currently having left side sharp pain to a dull pain back and forth.  She is not sure if she is having acute kidney stone issues for something else going on.  She would like to be evaluated as the last CT scan showed 2 punctate stones in the left kidney.  Patient was informed schedule is booked out at [facility] but she can schedule with any urologist with our central scheduling.  Patient may also go to urgent care or ER if pain becomes severe.  After conversation, patient decided she is going to call her healthcare coverage to figure out where certain imaging facilities that she may have imaging covered 100% then reach out to her pcp to help facilitate her care incase symptoms are not kidney stone related.  Patient will call [facility] if issues are kidney stone related.

## 2023-06-02 ENCOUNTER — IMMUNIZATION (OUTPATIENT)
Dept: NURSING | Facility: CLINIC | Age: 68
End: 2023-06-02
Payer: COMMERCIAL

## 2023-06-02 PROCEDURE — 91312 COVID-19 BIVALENT 12+ (PFIZER): CPT

## 2023-06-02 PROCEDURE — 0124A COVID-19 BIVALENT 12+ (PFIZER): CPT

## 2023-09-20 ENCOUNTER — OFFICE VISIT (OUTPATIENT)
Dept: FAMILY MEDICINE | Facility: CLINIC | Age: 68
End: 2023-09-20
Payer: COMMERCIAL

## 2023-09-20 VITALS
TEMPERATURE: 97.9 F | DIASTOLIC BLOOD PRESSURE: 84 MMHG | OXYGEN SATURATION: 99 % | HEART RATE: 82 BPM | SYSTOLIC BLOOD PRESSURE: 150 MMHG | RESPIRATION RATE: 16 BRPM

## 2023-09-20 DIAGNOSIS — T63.461A WASP STING, ACCIDENTAL OR UNINTENTIONAL, INITIAL ENCOUNTER: Primary | ICD-10-CM

## 2023-09-20 PROCEDURE — 99213 OFFICE O/P EST LOW 20 MIN: CPT | Performed by: FAMILY MEDICINE

## 2023-09-20 RX ORDER — PREDNISONE 20 MG/1
TABLET ORAL
Qty: 4 TABLET | Refills: 0 | Status: SHIPPED | OUTPATIENT
Start: 2023-09-20 | End: 2023-11-27

## 2023-09-20 RX ORDER — CLOBETASOL PROPIONATE 0.5 MG/G
CREAM TOPICAL
Qty: 30 G | Refills: 0 | Status: SHIPPED | OUTPATIENT
Start: 2023-09-20 | End: 2023-11-27

## 2023-09-20 RX ORDER — DESLORATADINE 5 MG/1
5 TABLET ORAL DAILY
Qty: 30 TABLET | Refills: 0 | Status: SHIPPED | OUTPATIENT
Start: 2023-09-20 | End: 2023-11-27

## 2023-09-20 NOTE — PATIENT INSTRUCTIONS
Reaction to wasp sting will cause swelling and redness and pain and itch. This can be a large local reaction which is an exaggerated body response to the venom.   I do not think there is a retained stinger - maybe a tiny bit is inside but the body should expel this and it is more risky to try to dig for it. No additional venom will be added by leaving it.   You could try applying duct tape to the area and pull away to remove anything sticking in the skin.     Continue ice  Topical clobetasol (steroid) every 6 hours to the site apply sparingly for itch and inflammation  I recommend a dose of prednisone 40mg once today, may repeat tomorrow if needed. Take with food. But you can try the topical first to see if that helps.   May take benadryl orally to help with itch. Use if future at first instance of sting to help block the reaction.   May switch to clarinex once or twice daily which will be less drowsy  No work for a day or two until hand is improved - note written    Watch for infection:  Infection is suspected when redness, swelling, and pain become dramatically worse three to five days after the sting, when the typical local reaction is beginning to regress     Recheck as needed.

## 2023-09-20 NOTE — LETTER
[facility]  [address] [address]  Phone: [phone]  Fax: [phone]    September 20, 2023        Gala Nicholas  [address] [address]          To whom it may concern:    RE: Gala Nicholas    Patient was seen and treated today at our clinic. Please excuse her from work through 9/22/23. She may return to her next scheduled shift on Monday 9/25/23.     Please contact me for questions or concerns.      Sincerely,        Isa Jang MD

## 2023-09-21 NOTE — PROGRESS NOTES
Assessment/Plan:   1. Wasp sting, accidental or unintentional, initial encounter  Wasp sting to right hand.  Occurred last evening.  Large local reaction.  We will try to suppress with high potency steroid cream, clobetasol every 6 hours initially decreasing to twice daily as the reaction subsides.  I recommend an oral dose of prednisone 40 mg today with a second dose tomorrow if needed.  She preferred to try the topical first and if needed will take the oral.  May try Benadryl 25 mg oral every 8 hours or so as needed for itching.  If helpful but causing drowsiness switch to a daily nondrowsy antihistamine.  Clarinex was covered on her insurance formulary and that was sent into the pharmacy for.  May apply ice for comfort.  Tylenol or ibuprofen if needed for pain.  Watch for potential secondary skin infection which would occur 3 to 4 days after the sting with increased pain redness swelling and stiffness of the wrist.  Return as needed.  - clobetasol (TEMOVATE) 0.05 % external cream; Apply sparingly to the affected area every 6 hours as needed for wasp sting  Dispense: 30 g; Refill: 0  - predniSONE (DELTASONE) 20 MG tablet; 40mg daily for 1-2 days  Dispense: 4 tablet; Refill: 0  - diphenhydrAMINE (BENADRYL) 25 MG tablet; Take 1 tablet (25 mg) by mouth every 8 hours as needed (itchiness)  Dispense: 12 tablet; Refill: 0  - desloratadine (CLARINEX) 5 MG tablet; Take 1 tablet (5 mg) by mouth daily  Dispense: 30 tablet; Refill: 0      I discussed red flag symptoms, return precautions to clinic/ER and follow up care with patient/parent.  Expected clinical course, symptomatic cares advised. Questions answered. Patient/parent amenable with plan.    Reaction to wasp sting will cause swelling and redness and pain and itch. This can be a large local reaction which is an exaggerated body response to the venom.   I do not think there is a retained stinger - maybe a tiny bit is inside but the body should expel this and it is more  risky to try to dig for it. No additional venom will be added by leaving it.   You could try applying duct tape to the area and pull away to remove anything sticking in the skin.     Continue ice  Topical clobetasol (steroid) every 6 hours to the site apply sparingly for itch and inflammation  I recommend a dose of prednisone 40mg once today, may repeat tomorrow if needed. Take with food. But you can try the topical first to see if that helps.   May take benadryl orally to help with itch. Use if future at first instance of sting to help block the reaction.   May switch to clarinex once or twice daily which will be less drowsy  No work for a day or two until hand is improved - note written    Watch for infection:  Infection is suspected when redness, swelling, and pain become dramatically worse three to five days after the sting, when the typical local reaction is beginning to regress     Recheck as needed.     Subjective:     Gala Nicholas is a 68 year old female who presents for evaluation of a yellowjacket sting which occurred last evening.  Sting occurred on the right hand.  Redness swelling warmth and itching developed within a few hours of the sting.  Worse this morning.  No shortness of breath hives nausea stomachache dizziness swelling of lips tongue or throat.  No wheezing or shortness of breath.  Reaction seems to be worse today, hot, red, tender and itchy.    Allergies   Allergen Reactions    Sulfa (Sulfonamide Antibiotics) [Sulfa Antibiotics] Rash     Current Outpatient Medications   Medication    aspirin (ASA) 81 MG EC tablet    clobetasol (TEMOVATE) 0.05 % external cream    desloratadine (CLARINEX) 5 MG tablet    diphenhydrAMINE (BENADRYL) 25 MG tablet    Omega-3 1000 MG capsule    predniSONE (DELTASONE) 20 MG tablet    timolol maleate (TIMOPTIC) 0.5 % ophthalmic solution    guaiFENesin-codeine (ROBITUSSIN AC) 100-10 MG/5ML solution     No current facility-administered medications for this visit.      Patient Active Problem List   Diagnosis    Hand arthritis    Hyperlipidemia    Murmur, cardiac    Genital warts    Herpes simplex    Postcoital urinary tract infection    Impaired fasting glucose    Glaucoma    Prediabetes    Nephrolithiasis       Objective:     BP (!) 150/84   Pulse 82   Temp 97.9  F (36.6  C) (Oral)   Resp 16   LMP  (LMP Unknown)   SpO2 99%     Physical    General Appearance: Alert, pleasant, no distress, AVSS  Head: Normocephalic, without obvious abnormality, atraumatic  Eyes: Conjunctivae are normal. No lid swelling  Throat: No tongue or throat swelling.  No vesicular lesions  Neck: No adenopathy  Lungs: Clear to auscultation bilaterally, respirations unlabored  Heart: Regular rate and rhythm  Extremities: Normal left arm wrist pulses and finger capillary refill.  There is localized soft tissue swelling redness warmth around a red punctate sting site on her right hand, primarily dorsum.  Able to flex and extend at the wrist and make a fist.  Sensation and strength are intact.  Skin: As above, no other rashes or lesions  Psychiatric: Patient has a normal mood and affect.       This note has been dictated in part using voice recognition software.  Any grammatical or context distortions are unintentional and inherent to the software.  Please feel free to contact me directly for clarification if needed.

## 2023-09-26 ENCOUNTER — TELEPHONE (OUTPATIENT)
Dept: FAMILY MEDICINE | Facility: CLINIC | Age: 68
End: 2023-09-26
Payer: COMMERCIAL

## 2023-09-26 NOTE — TELEPHONE ENCOUNTER
Patient Quality Outreach    Patient is due for the following:   Colon Cancer Screening  Breast Cancer Screening - Mammogram  Physical Annual Wellness Visit    Next Steps:   Patient was scheduled for AWV    Type of outreach:    Phone, spoke to patient/parent. Pt scheduled for AWV, pt is going to call to schedule Mammo and Dexa scan and Sent [facility] message.      Questions for provider review:    None           Sameera Steve MA

## 2023-10-21 ENCOUNTER — HEALTH MAINTENANCE LETTER (OUTPATIENT)
Age: 68
End: 2023-10-21

## 2023-11-20 ENCOUNTER — IMMUNIZATION (OUTPATIENT)
Dept: NURSING | Facility: CLINIC | Age: 68
End: 2023-11-20
Payer: COMMERCIAL

## 2023-11-20 PROCEDURE — 90480 ADMN SARSCOV2 VAC 1/ONLY CMP: CPT

## 2023-11-20 PROCEDURE — 91320 SARSCV2 VAC 30MCG TRS-SUC IM: CPT

## 2023-11-27 ENCOUNTER — TELEPHONE (OUTPATIENT)
Dept: FAMILY MEDICINE | Facility: CLINIC | Age: 68
End: 2023-11-27

## 2023-11-27 ENCOUNTER — OFFICE VISIT (OUTPATIENT)
Dept: FAMILY MEDICINE | Facility: CLINIC | Age: 68
End: 2023-11-27
Payer: COMMERCIAL

## 2023-11-27 VITALS
RESPIRATION RATE: 20 BRPM | BODY MASS INDEX: 27.5 KG/M2 | OXYGEN SATURATION: 96 % | DIASTOLIC BLOOD PRESSURE: 86 MMHG | WEIGHT: 131 LBS | TEMPERATURE: 98.3 F | SYSTOLIC BLOOD PRESSURE: 142 MMHG | HEART RATE: 104 BPM | HEIGHT: 58 IN

## 2023-11-27 DIAGNOSIS — Z12.11 SCREEN FOR COLON CANCER: ICD-10-CM

## 2023-11-27 DIAGNOSIS — N20.0 NEPHROLITHIASIS: Chronic | ICD-10-CM

## 2023-11-27 DIAGNOSIS — R73.03 PREDIABETES: Chronic | ICD-10-CM

## 2023-11-27 DIAGNOSIS — Z00.00 ENCOUNTER FOR MEDICARE ANNUAL WELLNESS EXAM: Primary | ICD-10-CM

## 2023-11-27 DIAGNOSIS — Z12.31 VISIT FOR SCREENING MAMMOGRAM: ICD-10-CM

## 2023-11-27 DIAGNOSIS — E78.2 MIXED HYPERLIPIDEMIA: Chronic | ICD-10-CM

## 2023-11-27 DIAGNOSIS — Z11.59 ENCOUNTER FOR HEPATITIS C SCREENING TEST FOR LOW RISK PATIENT: ICD-10-CM

## 2023-11-27 DIAGNOSIS — Z23 IMMUNIZATION DUE: ICD-10-CM

## 2023-11-27 DIAGNOSIS — R03.0 ELEVATED BLOOD PRESSURE READING WITHOUT DIAGNOSIS OF HYPERTENSION: ICD-10-CM

## 2023-11-27 DIAGNOSIS — Z13.0 SCREENING, IRON DEFICIENCY ANEMIA: ICD-10-CM

## 2023-11-27 DIAGNOSIS — H40.9 GLAUCOMA, UNSPECIFIED GLAUCOMA TYPE, UNSPECIFIED LATERALITY: ICD-10-CM

## 2023-11-27 LAB
ALBUMIN SERPL BCG-MCNC: 4.6 G/DL (ref 3.5–5.2)
ALP SERPL-CCNC: 90 U/L (ref 40–150)
ALT SERPL W P-5'-P-CCNC: 28 U/L (ref 0–50)
ANION GAP SERPL CALCULATED.3IONS-SCNC: 12 MMOL/L (ref 7–15)
AST SERPL W P-5'-P-CCNC: 27 U/L (ref 0–45)
BILIRUB SERPL-MCNC: 0.4 MG/DL
BUN SERPL-MCNC: 17 MG/DL (ref 8–23)
CALCIUM SERPL-MCNC: 9.8 MG/DL (ref 8.8–10.2)
CHLORIDE SERPL-SCNC: 104 MMOL/L (ref 98–107)
CHOLEST SERPL-MCNC: 304 MG/DL
CREAT SERPL-MCNC: 0.6 MG/DL (ref 0.51–0.95)
DEPRECATED HCO3 PLAS-SCNC: 25 MMOL/L (ref 22–29)
EGFRCR SERPLBLD CKD-EPI 2021: >90 ML/MIN/1.73M2
ERYTHROCYTE [DISTWIDTH] IN BLOOD BY AUTOMATED COUNT: 11.6 % (ref 10–15)
GLUCOSE SERPL-MCNC: 141 MG/DL (ref 70–99)
HBA1C MFR BLD: 6.1 % (ref 0–5.6)
HCT VFR BLD AUTO: 40 % (ref 35–47)
HDLC SERPL-MCNC: 81 MG/DL
HGB BLD-MCNC: 13.8 G/DL (ref 11.7–15.7)
LDLC SERPL CALC-MCNC: 157 MG/DL
MCH RBC QN AUTO: 32 PG (ref 26.5–33)
MCHC RBC AUTO-ENTMCNC: 34.5 G/DL (ref 31.5–36.5)
MCV RBC AUTO: 93 FL (ref 78–100)
NONHDLC SERPL-MCNC: 223 MG/DL
PLATELET # BLD AUTO: 357 10E3/UL (ref 150–450)
POTASSIUM SERPL-SCNC: 3.8 MMOL/L (ref 3.4–5.3)
PROT SERPL-MCNC: 7.8 G/DL (ref 6.4–8.3)
RBC # BLD AUTO: 4.31 10E6/UL (ref 3.8–5.2)
SODIUM SERPL-SCNC: 141 MMOL/L (ref 135–145)
TRIGL SERPL-MCNC: 329 MG/DL
WBC # BLD AUTO: 5.7 10E3/UL (ref 4–11)

## 2023-11-27 PROCEDURE — 99214 OFFICE O/P EST MOD 30 MIN: CPT | Mod: 25 | Performed by: FAMILY MEDICINE

## 2023-11-27 PROCEDURE — 86803 HEPATITIS C AB TEST: CPT | Performed by: FAMILY MEDICINE

## 2023-11-27 PROCEDURE — 85027 COMPLETE CBC AUTOMATED: CPT | Performed by: FAMILY MEDICINE

## 2023-11-27 PROCEDURE — 36415 COLL VENOUS BLD VENIPUNCTURE: CPT | Performed by: FAMILY MEDICINE

## 2023-11-27 PROCEDURE — 80053 COMPREHEN METABOLIC PANEL: CPT | Performed by: FAMILY MEDICINE

## 2023-11-27 PROCEDURE — 99397 PER PM REEVAL EST PAT 65+ YR: CPT | Performed by: FAMILY MEDICINE

## 2023-11-27 PROCEDURE — 83036 HEMOGLOBIN GLYCOSYLATED A1C: CPT | Performed by: FAMILY MEDICINE

## 2023-11-27 PROCEDURE — 80061 LIPID PANEL: CPT | Performed by: FAMILY MEDICINE

## 2023-11-27 ASSESSMENT — ENCOUNTER SYMPTOMS
FEVER: 0
NERVOUS/ANXIOUS: 1
DIARRHEA: 0
HEARTBURN: 0
HEMATURIA: 0
HEADACHES: 0
CHILLS: 0
MYALGIAS: 0
EYE PAIN: 0
NAUSEA: 0
PARESTHESIAS: 0
ABDOMINAL PAIN: 0
BREAST MASS: 0
FREQUENCY: 0
DIZZINESS: 0
JOINT SWELLING: 1
ARTHRALGIAS: 1
PALPITATIONS: 0
SORE THROAT: 0
COUGH: 1
WEAKNESS: 1
HEMATOCHEZIA: 0
SHORTNESS OF BREATH: 0
DYSURIA: 0
CONSTIPATION: 0

## 2023-11-27 ASSESSMENT — ACTIVITIES OF DAILY LIVING (ADL): CURRENT_FUNCTION: NO ASSISTANCE NEEDED

## 2023-11-27 NOTE — PROGRESS NOTES
"SUBJECTIVE:   Gala is a 68 year old, presenting for the following:  Wellness Visit  Patient comes with a list of things to talk about today.  Including her sensations in her ears bilaterally, follow-up kidney stones, previous COVID infection in May 2020 and URI infection earlier than that in 2020.  We discussed this is most likely RSV.  She should consider immunization since it did affect her pretty much.  We also discussed her benefits that require that she have labs done at [facility], radiology studies at [facility] and that she had to put off doing her preventative measures due to her 's hip replacement.  She is behind on her colon cancer, breast cancer, osteoporosis, and cervical cancer screenings.      11/27/2023    11:31 AM   Additional Questions   Roomed by Gen GOODRICH, CMA       Are you in the first 12 months of your Medicare coverage?  No    Healthy Habits:     In general, how would you rate your overall health?  Good    Frequency of exercise:  4-5 days/week    Duration of exercise:  15-30 minutes    Do you usually eat at least 4 servings of fruit and vegetables a day, include whole grains    & fiber and avoid regularly eating high fat or \"junk\" foods?  Yes    Taking medications regularly:  Not Applicable    Medication side effects:  Not applicable    Ability to successfully perform activities of daily living:  No assistance needed    Home Safety:  No safety concerns identified    Hearing Impairment:  Difficulty understanding soft or whispered speech    In the past 6 months, have you been bothered by leaking of urine?  No    In general, how would you rate your overall mental or emotional health?  Good    Additional concerns today:  Yes      Today's PHQ-2 Score:       11/27/2023    11:18 AM   PHQ-2 ( 1999 Pfizer)   Q1: Little interest or pleasure in doing things 1   Q2: Feeling down, depressed or hopeless 1   PHQ-2 Score 2   Q1: Little interest or pleasure in doing things Several days   Q2: Feeling down, depressed " or hopeless Several days   PHQ-2 Score 2           Have you ever done Advance Care Planning? (For example, a Health Directive, POLST, or a discussion with a medical provider or your loved ones about your wishes): No, advance care planning information given to patient to review.  Advanced care planning was discussed at today's visit.       Fall risk  Fallen 2 or more times in the past year?: No  Any fall with injury in the past year?: No    Cognitive Screening   1) Repeat 3 items (Leader, Season, Table)    2) Clock draw: NORMAL  3) 3 item recall: Recalls 3 objects  Results: 3 items recalled: COGNITIVE IMPAIRMENT LESS LIKELY    Mini-CogTM Copyright S Freeman. Licensed by the author for use in [facility]; reprinted with permission (sochristine@[facility]). All rights reserved.      Do you have sleep apnea, excessive snoring or daytime drowsiness? : no    Reviewed and updated as needed this visit by clinical staff   Tobacco  Allergies  Meds              Reviewed and updated as needed this visit by Provider                 Social History     Tobacco Use    Smoking status: Former     Types: Cigarettes     Quit date: 1984     Years since quittin.1     Passive exposure: Never    Smokeless tobacco: Never    Tobacco comments:     light smoker 30 years ago   Substance Use Topics    Alcohol use: Yes     Alcohol/week: 0.8 standard drinks of alcohol             2023    11:17 AM   Alcohol Use   Prescreen: >3 drinks/day or >7 drinks/week? No     Do you have a current opioid prescription? No  Do you use any other controlled substances or medications that are not prescribed by a provider? None    Current providers sharing in care for this patient include:   Patient Care Team:  Yessica Nolan MD as PCP - General (Family Medicine)  Yessica Nolan MD as Assigned PCP    The following health maintenance items are reviewed in Epic and correct as of today:  Health Maintenance   Topic Date Due    DEXCALIN Jolley  done    MAMMO SCREENING  Never done    COLORECTAL CANCER SCREENING  Never done    HEPATITIS C SCREENING  Never done    ZOSTER IMMUNIZATION (1 of 2) Never done    RSV VACCINE (Pregnancy & 60+) (1 - 1-dose 60+ series) Never done    Pneumococcal Vaccine: 65+ Years (1 - PCV) Never done    ANNUAL REVIEW OF HM ORDERS  02/07/2024    MEDICARE ANNUAL WELLNESS VISIT  11/27/2024    FALL RISK ASSESSMENT  11/27/2024    LIPID  05/02/2027    ADVANCE CARE PLANNING  11/27/2028    DTAP/TDAP/TD IMMUNIZATION (3 - Td or Tdap) 09/02/2030    PHQ-2 (once per calendar year)  Completed    INFLUENZA VACCINE  Completed    COVID-19 Vaccine  Completed    IPV IMMUNIZATION  Aged Out    HPV IMMUNIZATION  Aged Out    MENINGITIS IMMUNIZATION  Aged Out    RSV MONOCLONAL ANTIBODY  Aged Out    PAP  Discontinued     Labs reviewed in EPIC  BP Readings from Last 3 Encounters:   11/27/23 (!) 142/86   09/20/23 (!) 150/84   05/11/23 138/74    Wt Readings from Last 3 Encounters:   11/27/23 59.4 kg (131 lb)   05/11/23 59.3 kg (130 lb 11.2 oz)   02/07/23 59.4 kg (131 lb)                  Mammogram Screening: Mammogram Screening: Recommended mammography every 1-2 years with patient discussion and risk factor consideration    FHS-7:       11/27/2023    11:20 AM   Breast CA Risk Assessment (FHS-7)   Did any of your first-degree relatives have breast or ovarian cancer? Yes   Did any of your relatives have bilateral breast cancer? Unknown   Did any man in your family have breast cancer? Unknown   Did any woman in your family have breast and ovarian cancer? Yes   Did any woman in your family have breast cancer before age 50 y? Unknown   Do you have 2 or more relatives with breast and/or ovarian cancer? Unknown   Do you have 2 or more relatives with breast and/or bowel cancer? Unknown       Mammogram Screening: Recommended mammography every 1-2 years with patient discussion and risk factor consideration  Pertinent mammograms are reviewed under the imaging  "tab.    Review of Systems   Constitutional:  Negative for chills and fever.   HENT:  Positive for hearing loss. Negative for congestion, ear pain and sore throat.    Eyes:  Positive for visual disturbance. Negative for pain.   Respiratory:  Positive for cough. Negative for shortness of breath.    Cardiovascular:  Negative for chest pain, palpitations and peripheral edema.   Gastrointestinal:  Negative for abdominal pain, constipation, diarrhea, heartburn, hematochezia and nausea.   Breasts:  Negative for tenderness, breast mass and discharge.   Genitourinary:  Negative for dysuria, frequency, genital sores, hematuria, pelvic pain, urgency, vaginal bleeding and vaginal discharge.   Musculoskeletal:  Positive for arthralgias and joint swelling. Negative for myalgias.   Skin:  Negative for rash.   Neurological:  Positive for weakness. Negative for dizziness, headaches and paresthesias.   Psychiatric/Behavioral:  Positive for mood changes. The patient is nervous/anxious.        OBJECTIVE:   BP (!) 142/86   Pulse 104   Temp 98.3  F (36.8  C) (Oral)   Resp 20   Ht 1.473 m (4' 10\")   Wt 59.4 kg (131 lb)   LMP  (LMP Unknown)   SpO2 96%   BMI 27.38 kg/m   Estimated body mass index is 27.38 kg/m  as calculated from the following:    Height as of this encounter: 1.473 m (4' 10\").    Weight as of this encounter: 59.4 kg (131 lb).  Physical Exam  General appearance - alert, well appearing, and in no distress and petite  Mental status - normal mood, behavior, speech, dress, motor activity, and thought processes  Eyes - pupils equal and reactive, extraocular eye movements intact  Ears - bilateral TM's and external ear canals normal, dried skin membranes noted  Nose - normal and patent, no erythema, discharge   Mouth - mucous membranes moist, pharynx normal without lesions  Neck - supple, no significant adenopathy, carotids upstroke normal bilaterally, no bruits, thyroid exam: thyroid is normal in size without nodules or " tenderness  Chest - clear to auscultation, no wheezes, rales or rhonchi, symmetric air entry  Heart - normal rate and regular rhythm, no murmurs noted  Abdomen - soft, nontender, nondistended, no masses or organomegaly  Breasts - not examined, inadequate time-needs mammogram  Pelvic -in adequate time-needs Pap  Back exam - full range of motion, no tenderness, palpable spasm or pain on motion  Neurological - alert, oriented, normal speech, no focal findings or movement disorder noted, cranial nerves II through XII intact, DTR's normal and symmetric  Musculoskeletal - no joint tenderness, deformity or swelling  Extremities - peripheral pulses normal, no pedal edema, no clubbing or cyanosis  Skin - normal coloration and turgor, no rashes, no suspicious skin lesions noted      Labs reviewed in Epic  Results for orders placed or performed in visit on 11/27/23   Hemoglobin A1c     Status: Abnormal   Result Value Ref Range    Hemoglobin A1C 6.1 (H) 0.0 - 5.6 %   Comprehensive metabolic panel (BMP + Alb, Alk Phos, ALT, AST, Total. Bili, TP)     Status: Abnormal   Result Value Ref Range    Sodium 141 135 - 145 mmol/L    Potassium 3.8 3.4 - 5.3 mmol/L    Carbon Dioxide (CO2) 25 22 - 29 mmol/L    Anion Gap 12 7 - 15 mmol/L    Urea Nitrogen 17.0 8.0 - 23.0 mg/dL    Creatinine 0.60 0.51 - 0.95 mg/dL    GFR Estimate >90 >60 mL/min/1.73m2    Calcium 9.8 8.8 - 10.2 mg/dL    Chloride 104 98 - 107 mmol/L    Glucose 141 (H) 70 - 99 mg/dL    Alkaline Phosphatase 90 40 - 150 U/L    AST 27 0 - 45 U/L    ALT 28 0 - 50 U/L    Protein Total 7.8 6.4 - 8.3 g/dL    Albumin 4.6 3.5 - 5.2 g/dL    Bilirubin Total 0.4 <=1.2 mg/dL   Lipid panel reflex to direct LDL Fasting     Status: Abnormal   Result Value Ref Range    Cholesterol 304 (H) <200 mg/dL    Triglycerides 329 (H) <150 mg/dL    Direct Measure HDL 81 >=50 mg/dL    LDL Cholesterol Calculated 157 (H) <=100 mg/dL    Non HDL Cholesterol 223 (H) <130 mg/dL    Narrative     Cholesterol  Desirable:  <200 mg/dL    Triglycerides  Normal:  Less than 150 mg/dL  Borderline High:  150-199 mg/dL  High:  200-499 mg/dL  Very High:  Greater than or equal to 500 mg/dL    Direct Measure HDL  Female:  Greater than or equal to 50 mg/dL   Male:  Greater than or equal to 40 mg/dL    LDL Cholesterol  Desirable:  <100mg/dL  Above Desirable:  100-129 mg/dL   Borderline High:  130-159 mg/dL   High:  160-189 mg/dL   Very High:  >= 190 mg/dL    Non HDL Cholesterol  Desirable:  130 mg/dL  Above Desirable:  130-159 mg/dL  Borderline High:  160-189 mg/dL  High:  190-219 mg/dL  Very High:  Greater than or equal to 220 mg/dL   CBC with platelets     Status: Normal   Result Value Ref Range    WBC Count 5.7 4.0 - 11.0 10e3/uL    RBC Count 4.31 3.80 - 5.20 10e6/uL    Hemoglobin 13.8 11.7 - 15.7 g/dL    Hematocrit 40.0 35.0 - 47.0 %    MCV 93 78 - 100 fL    MCH 32.0 26.5 - 33.0 pg    MCHC 34.5 31.5 - 36.5 g/dL    RDW 11.6 10.0 - 15.0 %    Platelet Count 357 150 - 450 10e3/uL   Hepatitis C Screen Reflex to HCV RNA Quant and Genotype     Status: Normal   Result Value Ref Range    Hepatitis C Antibody Nonreactive Nonreactive    Narrative    Assay performance characteristics have not been established for newborns, infants, and children.       ASSESSMENT / PLAN:     Encounter for Medicare annual wellness exam  Patient has never had a colonoscopy, mammogram or DEXA scan.  Her last Pap was in September 2016 and she is also behind on this.  She had her COVID and flu vaccines but is eligible for RSV, Shingrix and pneumococcal vaccines which she may need to do in the pharmacy  - PRIMARY CARE FOLLOW-UP SCHEDULING    Elevated blood pressure reading without diagnosis of hypertension  There may be an element of whitecoat syndrome.  She has had multiple elevated blood pressures and we are contemplating future treatment.  She is to try to take her own blood pressure readings and follow-up with me.    Mixed hyperlipidemia  Not on  statin.  Last total cholesterol was 297 with an LDL of 174 back in May 2022.  Will monitor labs.  - Comprehensive metabolic panel (BMP + Alb, Alk Phos, ALT, AST, Total. Bili, TP)  - Lipid panel reflex to direct LDL Fasting  - Comprehensive metabolic panel (BMP + Alb, Alk Phos, ALT, AST, Total. Bili, TP)  - Lipid panel reflex to direct LDL Fasting    Prediabetes  Had previously had an A1c of 6.2% in June 2021.  Is likely due to a genetic predisposition as her mother has diabetes.  Will recheck.  - Hemoglobin A1c  - Hemoglobin A1c    Nephrolithiasis  Had known kidney stone seen on previous CT and patient has intermittent flank pain.  I would like to follow-up to see if she still has obvious kidney stones.  This study must be done through her [employer] benefit.  Will call and fax order if appropriate.  - CT Abdomen Pelvis w/o Contrast    Glaucoma, unspecified glaucoma type, unspecified laterality  Is on eyedrops.  Seen regularly.    Encounter for hepatitis C screening test for low risk patient  - Hepatitis C Screen Reflex to HCV RNA Quant and Genotype  - Hepatitis C Screen Reflex to HCV RNA Quant and Genotype    Screening, iron deficiency anemia  - CBC with platelets  - CBC with platelets    Visit for screening mammogram  This study also needs to be done through her [employer] benefits.  I will fax order if appropriate.  - MA Screen Bilateral w/Diego    Screen for colon cancer  Patient tells me that this can be done through [place] [facility].  I will put through the order.  - Colonoscopy Screening  Referral    Immunization due  Has already had her flu and COVID vaccines and intends to do Shingrix and pneumonia and possibly RSV at the pharmacy.    I will get back to her on her lab results by [facility] or mail and only call with grossly abnormal values.  She still needs to have her Pap and HPV checked, her blood pressure rechecked and perhaps get her ears washed out if she so desires and plans to have a future appointment in early  "December, already scheduled.    Patient has been advised of split billing requirements and indicates understanding: Yes      COUNSELING:  Reviewed preventive health counseling, as reflected in patient instructions      BMI:   Estimated body mass index is 27.38 kg/m  as calculated from the following:    Height as of this encounter: 1.473 m (4' 10\").    Weight as of this encounter: 59.4 kg (131 lb).         She reports that she quit smoking about 39 years ago. Her smoking use included cigarettes. She has never been exposed to tobacco smoke. She has never used smokeless tobacco.      Appropriate preventive services were discussed with this patient, including applicable screening as appropriate for fall prevention, nutrition, physical activity, Tobacco-use cessation, weight loss and cognition.  Checklist reviewing preventive services available has been given to the patient.    Reviewed patients plan of care and provided an AVS. The Complex Care Plan (for patients with higher acuity and needing more deliberate coordination of services) for Gala meets the Care Plan requirement. This Care Plan has been established and reviewed with the Patient.      Yessica Nolan MD  [facility]    Identified Health Risks:  I have reviewed Opioid Use Disorder and Substance Use Disorder risk factors and made any needed referrals.   "

## 2023-11-27 NOTE — PATIENT INSTRUCTIONS
Patient Education   Personalized Prevention Plan  You are due for the preventive services outlined below.  Your care team is available to assist you in scheduling these services.  If you have already completed any of these items, please share that information with your care team to update in your medical record.  Health Maintenance Due   Topic Date Due     Osteoporosis Screening  Never done     Discuss Advance Care Planning  Never done     Mammogram  Never done     Colorectal Cancer Screening  Never done     Hepatitis C Screening  Never done     Zoster (Shingles) Vaccine (1 of 2) Never done     RSV VACCINE (Pregnancy & 60+) (1 - 1-dose 60+ series) Never done     Pneumococcal Vaccine (1 - PCV) Never done

## 2023-11-27 NOTE — TELEPHONE ENCOUNTER
Pt needs help with authorization to complete CT and mammogram in house (per pt ) ordered by Dr. Nolan on 11/27/23.    See scanned card for contact info. Team care scanned on 11/27/23.    I called the [facility] ( number on the team care card) however they have no imaging locations close for her to go to. Referrals needs to complete an authorization with [facility] Care so that pt can get imaging in house.

## 2023-11-28 LAB — HCV AB SERPL QL IA: NONREACTIVE

## 2024-02-10 ENCOUNTER — OFFICE VISIT (OUTPATIENT)
Dept: FAMILY MEDICINE | Facility: CLINIC | Age: 69
End: 2024-02-10
Payer: COMMERCIAL

## 2024-02-10 VITALS
RESPIRATION RATE: 18 BRPM | TEMPERATURE: 97.7 F | SYSTOLIC BLOOD PRESSURE: 157 MMHG | HEART RATE: 86 BPM | BODY MASS INDEX: 27.38 KG/M2 | OXYGEN SATURATION: 97 % | WEIGHT: 131 LBS | DIASTOLIC BLOOD PRESSURE: 84 MMHG

## 2024-02-10 DIAGNOSIS — H93.8X1 EAR FULLNESS, RIGHT: Primary | ICD-10-CM

## 2024-02-10 PROCEDURE — 99213 OFFICE O/P EST LOW 20 MIN: CPT | Performed by: FAMILY MEDICINE

## 2024-02-10 NOTE — PROGRESS NOTES
"Assessment:     Ear fullness, right     Plan:     Patient with numerous flaps of skin within her ear canal that seems to be causing her symptoms.  This was removed with an curette and alligator forceps with complete relief of patient's symptoms.    Subjective:       68 year old female presents for evaluation a couple day history of some pressure in her right ear.  It was okay this morning but then returned again.  She occasionally has sensation that something is in her left ear canal as well.  She was told by her PCP that she has some \"flaps of skin\" within her ear canal.  Make sure she does not have an ear infection.  No fevers.  No nasal congestion or cough.        Patient Active Problem List   Diagnosis    Hand arthritis    Hyperlipidemia    Murmur, cardiac    Genital warts    Herpes simplex    Postcoital urinary tract infection    Impaired fasting glucose    Glaucoma    Prediabetes    Nephrolithiasis       Past Medical History:   Diagnosis Date    H/O colposcopy with cervical biopsy     Post-menopausal        Past Surgical History:   Procedure Laterality Date    BIOPSY CERVICAL, LOCAL EXCISION, SINGLE/MULTIPLE         Current Outpatient Medications   Medication    aspirin (ASA) 81 MG EC tablet    Omega-3 1000 MG capsule    timolol maleate (TIMOPTIC) 0.5 % ophthalmic solution    guaiFENesin-codeine (ROBITUSSIN AC) 100-10 MG/5ML solution     No current facility-administered medications for this visit.       Allergies   Allergen Reactions    Sulfa (Sulfonamide Antibiotics) [Sulfa Antibiotics] Rash       Family History   Problem Relation Age of Onset    Diabetes Mother     Hypertension Father     Cancer Father     Heart Disease Paternal Grandfather     Atrial fibrillation Paternal Grandfather     Cerebrovascular Disease Paternal Grandmother     Atrial fibrillation Brother     Hypertension Maternal Grandmother        Social History     Socioeconomic History    Marital status:      Spouse name: None    Number " of children: 0    Years of education: 16    Highest education level: None   Tobacco Use    Smoking status: Former     Types: Cigarettes     Quit date: 1984     Years since quittin.4     Passive exposure: Never    Smokeless tobacco: Never    Tobacco comments:     light smoker 30 years ago   Vaping Use    Vaping Use: Never used   Substance and Sexual Activity    Alcohol use: Yes     Alcohol/week: 0.8 standard drinks of alcohol    Drug use: No    Sexual activity: Yes     Partners: Male     Social Determinants of Health     Financial Resource Strain: Low Risk  (2023)    Financial Resource Strain     Within the past 12 months, have you or your family members you live with been unable to get utilities (heat, electricity) when it was really needed?: No   Food Insecurity: Low Risk  (2023)    Food Insecurity     Within the past 12 months, did you worry that your food would run out before you got money to buy more?: No     Within the past 12 months, did the food you bought just not last and you didn t have money to get more?: No   Transportation Needs: Low Risk  (2023)    Transportation Needs     Within the past 12 months, has lack of transportation kept you from medical appointments, getting your medicines, non-medical meetings or appointments, work, or from getting things that you need?: No   Interpersonal Safety: Low Risk  (2023)    Interpersonal Safety     Do you feel physically and emotionally safe where you currently live?: Yes     Within the past 12 months, have you been hit, slapped, kicked or otherwise physically hurt by someone?: No     Within the past 12 months, have you been humiliated or emotionally abused in other ways by your partner or ex-partner?: No   Housing Stability: Low Risk  (2023)    Housing Stability     Do you have housing? : Yes     Are you worried about losing your housing?: No     Review of Systems  Pertinent items are noted in HPI.      Objective:     BP  (!) 157/84 (BP Location: Right arm, Patient Position: Sitting, Cuff Size: Adult Regular)   Pulse 86   Temp 97.7  F (36.5  C) (Oral)   Resp 18   Wt 59.4 kg (131 lb)   LMP  (LMP Unknown)   SpO2 97%   BMI 27.38 kg/m       General appearance: alert, appears stated age, and cooperative  Ears: Patient with flaps of skin within the ear canal.  I am able to visualize her tympanic membrane bilaterally which appears normal with normal landmarks.  Flaps of skin removed with curettage and alligator forceps.  Patient had complete relief of symptoms following removal.    This note has been dictated using voice recognition software. Any grammatical or context distortions are unintentional and inherent to the software

## 2024-05-03 ENCOUNTER — IMMUNIZATION (OUTPATIENT)
Dept: NURSING | Facility: CLINIC | Age: 69
End: 2024-05-03
Payer: COMMERCIAL

## 2024-05-03 PROCEDURE — 91320 SARSCV2 VAC 30MCG TRS-SUC IM: CPT

## 2024-05-03 PROCEDURE — 90480 ADMN SARSCOV2 VAC 1/ONLY CMP: CPT

## 2024-09-09 RX ORDER — PODOFILOX 5 MG/G
GEL TOPICAL 2 TIMES DAILY
COMMUNITY
End: 2024-09-09

## 2024-09-09 RX ORDER — PODOFILOX 5 MG/G
GEL TOPICAL 2 TIMES DAILY
COMMUNITY
Start: 2024-09-09

## 2024-09-09 RX ORDER — NITROFURANTOIN MACROCRYSTALS 50 MG/1
50 CAPSULE ORAL 4 TIMES DAILY
COMMUNITY
End: 2024-09-09

## 2024-09-09 RX ORDER — NITROFURANTOIN MACROCRYSTALS 50 MG/1
50 CAPSULE ORAL 4 TIMES DAILY
COMMUNITY
Start: 2024-09-09

## 2024-09-09 RX ORDER — VALACYCLOVIR HYDROCHLORIDE 500 MG/1
500 TABLET, FILM COATED ORAL 2 TIMES DAILY
COMMUNITY

## 2024-09-09 RX ORDER — ACYCLOVIR 50 MG/G
CREAM TOPICAL
COMMUNITY
End: 2024-09-09

## 2024-09-09 RX ORDER — ACYCLOVIR 50 MG/G
CREAM TOPICAL
COMMUNITY
Start: 2024-09-09

## 2024-11-07 ENCOUNTER — TRANSFERRED RECORDS (OUTPATIENT)
Dept: HEALTH INFORMATION MANAGEMENT | Facility: CLINIC | Age: 69
End: 2024-11-07

## 2024-12-14 ENCOUNTER — HEALTH MAINTENANCE LETTER (OUTPATIENT)
Age: 69
End: 2024-12-14

## 2025-01-16 ENCOUNTER — LAB (OUTPATIENT)
Dept: LAB | Facility: CLINIC | Age: 70
End: 2025-01-16
Payer: COMMERCIAL

## 2025-01-16 ENCOUNTER — TELEPHONE (OUTPATIENT)
Dept: FAMILY MEDICINE | Facility: CLINIC | Age: 70
End: 2025-01-16

## 2025-01-16 DIAGNOSIS — R30.0 DYSURIA: Primary | ICD-10-CM

## 2025-01-16 DIAGNOSIS — R30.0 DYSURIA: ICD-10-CM

## 2025-01-16 LAB
ALBUMIN UR-MCNC: NEGATIVE MG/DL
APPEARANCE UR: CLEAR
BACTERIA #/AREA URNS HPF: ABNORMAL /HPF
BILIRUB UR QL STRIP: NEGATIVE
COLOR UR AUTO: YELLOW
GLUCOSE UR STRIP-MCNC: NEGATIVE MG/DL
HGB UR QL STRIP: ABNORMAL
KETONES UR STRIP-MCNC: NEGATIVE MG/DL
LEUKOCYTE ESTERASE UR QL STRIP: NEGATIVE
NITRATE UR QL: NEGATIVE
PH UR STRIP: 5.5 [PH] (ref 5–8)
RBC #/AREA URNS AUTO: ABNORMAL /HPF
SP GR UR STRIP: >=1.03 (ref 1–1.03)
SQUAMOUS #/AREA URNS AUTO: ABNORMAL /LPF
UROBILINOGEN UR STRIP-ACNC: 0.2 E.U./DL
WBC #/AREA URNS AUTO: ABNORMAL /HPF

## 2025-01-16 NOTE — TELEPHONE ENCOUNTER
Patient came into clinic and I spoke to her at the .  Her urinalysis was completely normal except for large amount of blood.  I told her this means that there is no infection and she does not need an antibiotic.  It does however need to look be looked into.  She is going to make a follow-up appointment with me.

## 2025-01-16 NOTE — TELEPHONE ENCOUNTER
Please call on her cell with her test results from today and also would like a copy to  at the .  Does not have voice mail, but will call us back for the results.  Thank you

## 2025-01-20 ENCOUNTER — OFFICE VISIT (OUTPATIENT)
Dept: FAMILY MEDICINE | Facility: CLINIC | Age: 70
End: 2025-01-20
Payer: COMMERCIAL

## 2025-01-20 ENCOUNTER — TELEPHONE (OUTPATIENT)
Dept: FAMILY MEDICINE | Facility: CLINIC | Age: 70
End: 2025-01-20

## 2025-01-20 VITALS
TEMPERATURE: 98.2 F | WEIGHT: 135 LBS | SYSTOLIC BLOOD PRESSURE: 125 MMHG | HEART RATE: 96 BPM | RESPIRATION RATE: 16 BRPM | DIASTOLIC BLOOD PRESSURE: 79 MMHG | OXYGEN SATURATION: 99 % | BODY MASS INDEX: 28.34 KG/M2 | HEIGHT: 58 IN

## 2025-01-20 DIAGNOSIS — E78.2 MIXED HYPERLIPIDEMIA: ICD-10-CM

## 2025-01-20 DIAGNOSIS — B00.9 HERPES SIMPLEX VIRUS INFECTION: ICD-10-CM

## 2025-01-20 DIAGNOSIS — N95.2 VAGINAL ATROPHY: ICD-10-CM

## 2025-01-20 DIAGNOSIS — R73.03 PREDIABETES: Chronic | ICD-10-CM

## 2025-01-20 DIAGNOSIS — Z12.11 SCREEN FOR COLON CANCER: ICD-10-CM

## 2025-01-20 DIAGNOSIS — Z78.0 ASYMPTOMATIC POSTMENOPAUSAL STATUS: ICD-10-CM

## 2025-01-20 DIAGNOSIS — H25.11 AGE-RELATED NUCLEAR CATARACT OF RIGHT EYE: ICD-10-CM

## 2025-01-20 DIAGNOSIS — R31.0 GROSS HEMATURIA: Primary | ICD-10-CM

## 2025-01-20 DIAGNOSIS — N20.0 NEPHROLITHIASIS: Chronic | ICD-10-CM

## 2025-01-20 DIAGNOSIS — I10 BENIGN ESSENTIAL HYPERTENSION: ICD-10-CM

## 2025-01-20 DIAGNOSIS — Z12.31 VISIT FOR SCREENING MAMMOGRAM: ICD-10-CM

## 2025-01-20 DIAGNOSIS — H40.1111 PRIMARY OPEN ANGLE GLAUCOMA (POAG) OF RIGHT EYE, MILD STAGE: ICD-10-CM

## 2025-01-20 LAB
ALBUMIN UR-MCNC: NEGATIVE MG/DL
APPEARANCE UR: CLEAR
BACTERIA #/AREA URNS HPF: ABNORMAL /HPF
BILIRUB UR QL STRIP: NEGATIVE
CHOLEST SERPL-MCNC: 315 MG/DL
COLOR UR AUTO: YELLOW
EST. AVERAGE GLUCOSE BLD GHB EST-MCNC: 137 MG/DL
FASTING STATUS PATIENT QL REPORTED: ABNORMAL
GLUCOSE UR STRIP-MCNC: NEGATIVE MG/DL
HBA1C MFR BLD: 6.4 % (ref 0–5.6)
HDLC SERPL-MCNC: 84 MG/DL
HGB UR QL STRIP: ABNORMAL
KETONES UR STRIP-MCNC: NEGATIVE MG/DL
LDLC SERPL CALC-MCNC: ABNORMAL MG/DL
LDLC SERPL DIRECT ASSAY-MCNC: 192 MG/DL
LEUKOCYTE ESTERASE UR QL STRIP: NEGATIVE
NITRATE UR QL: NEGATIVE
NONHDLC SERPL-MCNC: 231 MG/DL
PH UR STRIP: 5.5 [PH] (ref 5–8)
RBC #/AREA URNS AUTO: ABNORMAL /HPF
SP GR UR STRIP: 1.01 (ref 1–1.03)
SQUAMOUS #/AREA URNS AUTO: ABNORMAL /LPF
TRIGL SERPL-MCNC: 454 MG/DL
UROBILINOGEN UR STRIP-ACNC: 0.2 E.U./DL
WBC #/AREA URNS AUTO: ABNORMAL /HPF

## 2025-01-20 PROCEDURE — 83036 HEMOGLOBIN GLYCOSYLATED A1C: CPT | Performed by: FAMILY MEDICINE

## 2025-01-20 PROCEDURE — 99215 OFFICE O/P EST HI 40 MIN: CPT | Performed by: FAMILY MEDICINE

## 2025-01-20 PROCEDURE — 99214 OFFICE O/P EST MOD 30 MIN: CPT | Performed by: FAMILY MEDICINE

## 2025-01-20 PROCEDURE — 80061 LIPID PANEL: CPT | Performed by: FAMILY MEDICINE

## 2025-01-20 PROCEDURE — 81001 URINALYSIS AUTO W/SCOPE: CPT | Performed by: FAMILY MEDICINE

## 2025-01-20 PROCEDURE — G2211 COMPLEX E/M VISIT ADD ON: HCPCS | Performed by: FAMILY MEDICINE

## 2025-01-20 PROCEDURE — 83721 ASSAY OF BLOOD LIPOPROTEIN: CPT | Mod: 59 | Performed by: FAMILY MEDICINE

## 2025-01-20 PROCEDURE — 80053 COMPREHEN METABOLIC PANEL: CPT | Performed by: FAMILY MEDICINE

## 2025-01-20 PROCEDURE — 36415 COLL VENOUS BLD VENIPUNCTURE: CPT | Performed by: FAMILY MEDICINE

## 2025-01-20 RX ORDER — ACYCLOVIR 50 MG/G
CREAM TOPICAL
Qty: 5 G | Refills: 1 | Status: SHIPPED | OUTPATIENT
Start: 2025-01-20

## 2025-01-20 RX ORDER — VALACYCLOVIR HYDROCHLORIDE 500 MG/1
500 TABLET, FILM COATED ORAL 2 TIMES DAILY
Qty: 6 TABLET | Refills: 1 | Status: SHIPPED | OUTPATIENT
Start: 2025-01-20 | End: 2025-01-23

## 2025-01-20 RX ORDER — LOSARTAN POTASSIUM 25 MG/1
25 TABLET ORAL DAILY
Qty: 90 TABLET | Refills: 0 | Status: SHIPPED | OUTPATIENT
Start: 2025-01-20

## 2025-01-20 RX ORDER — NITROFURANTOIN MACROCRYSTALS 50 MG/1
50 CAPSULE ORAL 4 TIMES DAILY
Qty: 30 CAPSULE | Refills: 1 | Status: SHIPPED | OUTPATIENT
Start: 2025-01-20

## 2025-01-20 ASSESSMENT — PAIN SCALES - GENERAL: PAINLEVEL_OUTOF10: NO PAIN (0)

## 2025-01-20 NOTE — LETTER
January 24, 2025      Gala Cristopher  [address]  [place] [address]        Dear ,    We are writing to inform you of your test results.    I am sending you a copy of your lab results.  Your A1c is just inside the prediabetes range.  Dangerously close to diabetic.  Genetic I am sure.  More importantly, your cholesterol result is really very very high.  This absolutely needs to be treated.  You are now old enough for your risk for heart attack or stroke to be equal to a man's risk.  If you have not made a follow-up appointment yet, you could do a video visit if you prefer that to being seen live.  We will need to repeat these labs, hopefully after you have started a medication.  The 10-year ASCVD risk score (Marisa TUBBS, et al., 2019) is: 10.3%    Values used to calculate the score:      Age: 69 years      Sex: Female      Is Non- : No      Diabetic: No      Tobacco smoker: No      Systolic Blood Pressure: 125 mmHg      Is BP treated: Yes      HDL Cholesterol: 84 mg/dL      Total Cholesterol: 315 mg/dL      Resulted Orders   UA Macroscopic with reflex to Microscopic and Culture - Lab Collect   Result Value Ref Range    Color Urine Yellow Colorless, Straw, Light Yellow, Yellow    Appearance Urine Clear Clear    Glucose Urine Negative Negative mg/dL    Bilirubin Urine Negative Negative    Ketones Urine Negative Negative mg/dL    Specific Gravity Urine 1.010 1.005 - 1.030    Blood Urine Trace (A) Negative    pH Urine 5.5 5.0 - 8.0    Protein Albumin Urine Negative Negative mg/dL    Urobilinogen Urine 0.2 0.2, 1.0 E.U./dL    Nitrite Urine Negative Negative    Leukocyte Esterase Urine Negative Negative   Hemoglobin A1c   Result Value Ref Range    Estimated Average Glucose 137 (H) <117 mg/dL    Hemoglobin A1C 6.4 (H) 0.0 - 5.6 %      Comment:      Normal <5.7%   Prediabetes 5.7-6.4%    Diabetes 6.5% or higher     Note: Adopted from ADA consensus guidelines.   Comprehensive metabolic  panel (BMP + Alb, Alk Phos, ALT, AST, Total. Bili, TP)   Result Value Ref Range    Sodium 137 135 - 145 mmol/L    Potassium 4.0 3.4 - 5.3 mmol/L    Carbon Dioxide (CO2) 22 22 - 29 mmol/L    Anion Gap 13 7 - 15 mmol/L    Urea Nitrogen 15.2 8.0 - 23.0 mg/dL    Creatinine 0.70 0.51 - 0.95 mg/dL    GFR Estimate >90 >60 mL/min/1.73m2      Comment:      eGFR calculated using 2021 CKD-EPI equation.    Calcium 9.6 8.8 - 10.4 mg/dL    Chloride 102 98 - 107 mmol/L    Glucose 157 (H) 70 - 99 mg/dL    Alkaline Phosphatase 100 40 - 150 U/L    AST 30 0 - 45 U/L    ALT 29 0 - 50 U/L    Protein Total 7.5 6.4 - 8.3 g/dL    Albumin 4.4 3.5 - 5.2 g/dL    Bilirubin Total 0.4 <=1.2 mg/dL    Patient Fasting > 8hrs? Unknown    Lipid panel reflex to direct LDL Non-fasting   Result Value Ref Range    Cholesterol 315 (H) <200 mg/dL    Triglycerides 454 (H) <150 mg/dL    Direct Measure HDL 84 >=50 mg/dL    LDL Cholesterol Calculated        Comment:      Cannot estimate LDL when triglyceride exceeds 400 mg/dL    Non HDL Cholesterol 231 (H) <130 mg/dL    Patient Fasting > 8hrs? Unknown     Narrative    Cholesterol  Desirable: < 200 mg/dL  Borderline High: 200 - 239 mg/dL  High: >= 240 mg/dL    Triglycerides  Normal: < 150 mg/dL  Borderline High: 150 - 199 mg/dL  High: 200-499 mg/dL  Very High: >= 500 mg/dL    Direct Measure HDL  Female: >= 50 mg/dL   Male: >= 40 mg/dL    LDL Cholesterol  Desirable: < 100 mg/dL  Above Desirable: 100 - 129 mg/dL   Borderline High: 130 - 159 mg/dL   High:  160 - 189 mg/dL   Very High: >= 190 mg/dL    Non HDL Cholesterol  Desirable: < 130 mg/dL  Above Desirable: 130 - 159 mg/dL  Borderline High: 160 - 189 mg/dL  High: 190 - 219 mg/dL  Very High: >= 220 mg/dL   UA Microscopic with Reflex to Culture   Result Value Ref Range    Bacteria Urine Few (A) None Seen /HPF    RBC Urine 2-5 (A) 0-2 /HPF /HPF    WBC Urine 0-5 0-5 /HPF /HPF    Squamous Epithelials Urine Few (A) None Seen /LPF    Narrative    Urine Culture not  indicated   LDL cholesterol direct   Result Value Ref Range    LDL Cholesterol Direct 192 (H) <100 mg/dL      Comment:      Age 2-19 years:  Desirable: < 110 mg/dL   Borderline High: 110-129 mg/dL   High: >= 130 mg/dL    Age 20 years and older:  Desirable: < 100 mg/dL  Above Desirable: 100-129 mg/dL   Borderline High: 130-159 mg/dL   High: 160-189 mg/dL   Very High: >= 190 mg/dL       If you have any questions or concerns, please call the clinic at the number listed above.       Sincerely,      Yessica Nolan MD    Electronically signed

## 2025-01-20 NOTE — TELEPHONE ENCOUNTER
[facility] Prior Authorization Team  Phone: [phone]    PRIOR AUTHORIZATION DENIED    Medication: ACYCLOVIR 5 % EX CREA  Insurance Company: [employer] - Phone [phone] Fax [phone]  Denial Date: 1/20/2025  Denial Reason(s):         Appeal Information:         Patient Notified: NO- Unfortunately, we cannot call the patient with denials because we do not know what next steps the MD will take nor can we give medical advice, please notify the patient of what they are to expect for the continuation of their therapy from the provider.

## 2025-01-20 NOTE — PROGRESS NOTES
"  Assessment & Plan     Gross hematuria  ***  - UA Macroscopic with reflex to Microscopic and Culture - Lab Collect    Nephrolithiasis  ***    Benign essential hypertension  ***  - Comprehensive metabolic panel (BMP + Alb, Alk Phos, ALT, AST, Total. Bili, TP)    Mixed hyperlipidemia  ***  - Lipid panel reflex to direct LDL Non-fasting    Prediabetes  ***  - Hemoglobin A1c    Asymptomatic postmenopausal status  ***  - DEXA HIP/PELVIS/SPINE - Future    Vaginal atrophy  ***  - ESTRADIOL 0.1MG/GM CREAM  Dispense: 30 g; Refill: 1  - nitroFURantoin macrocrystal (MACRODANTIN) 50 MG capsule  Dispense: 30 capsule; Refill: 1    Herpes simplex virus infection  ***  - acyclovir (ZOVIRAX) 5 % external cream  Dispense: 5 g; Refill: 1  - valACYclovir (VALTREX) 500 MG tablet  Dispense: 6 tablet; Refill: 1    Screen for colon cancer  ***  - Colonoscopy Screening  Referral    Visit for screening mammogram  ***  - MA Screening Bilateral w/ Diego            BMI  Estimated body mass index is 28.22 kg/m  as calculated from the following:    Height as of this encounter: 1.473 m (4' 10\").    Weight as of this encounter: 61.2 kg (135 lb).       FUTURE APPOINTMENTS:       - Follow-up visit in ***      Ricardo Cedeño is a 69 year old, presenting for the following health issues:  RECHECK (Follow up . UTI , ) and Hypertension (Boarder line high BP and DM )      1/20/2025    11:58 AM   Additional Questions   Roomed by moon salinas cma     HPI       {ROS Picklists (Optional):917354}      Objective    /79   Pulse 96   Temp 98.2  F (36.8  C)   Resp 16   Ht 1.473 m (4' 10\")   Wt 61.2 kg (135 lb)   LMP  (LMP Unknown)   SpO2 99%   BMI 28.22 kg/m    Body mass index is 28.22 kg/m .  Physical Exam   {Exam List (Optional):735387}    {Diagnostic Test Results (Optional):926284}        Signed Electronically by: Yessica Nolan MD  {Email feedback regarding this note to [email or web address]   :610364}  " "postcoitally.  - ESTRADIOL 0.1MG/GM CREAM  Dispense: 30 g; Refill: 1  - nitroFURantoin macrocrystal (MACRODANTIN) 50 MG capsule  Dispense: 30 capsule; Refill: 1    Herpes simplex virus infection  Would like refills of her medicine so that she can have it on hand when needed.  - acyclovir (ZOVIRAX) 5 % external cream  Dispense: 5 g; Refill: 1  - valACYclovir (VALTREX) 500 MG tablet  Dispense: 6 tablet; Refill: 1    Screen for colon cancer  Discussed her need to get this done, especially because she is 69 and heading for [employer].  - Colonoscopy Screening  Referral    Visit for screening mammogram  Needs this as well  - MA Screening Bilateral w/ Diego    I will get back to her on lab results by MyChart or mail and only call with grossly abnormal values.  She is to follow-up for nurse only blood pressure recheck after being on the medication 2 to 4 weeks.    The longitudinal plan of care for the diagnosis(es)/condition(s) as documented were addressed during this visit. Due to the added complexity in care, I will continue to support Gala in the subsequent management and with ongoing continuity of care.      BMI  Estimated body mass index is 28.22 kg/m  as calculated from the following:    Height as of this encounter: 1.473 m (4' 10\").    Weight as of this encounter: 61.2 kg (135 lb).       I spent a total of 53 minutes on the day of the visit.   Time spent by me today doing chart review, history and exam, documentation and further activities per the note    FUTURE APPOINTMENTS:       - Follow-up visit in 6 months if all is well.      Subjective   Gala is a 69 year old, presenting for the following health issues:  RECHECK (Follow up . UTI , ) and Hypertension (Boarder line high BP and DM )        1/20/2025    11:58 AM   Additional Questions   Roomed by moon LO     This is a patient who last saw me on 11/27/2023 for an annual wellness visit.  She has conditions that she has been unwilling to treat " with medications.  She takes lots of supplements and has avoided Western medication, despite being very faithful with bringing her mom into be seen who is quite healthy but has diabetes.  She also has strong predisposition for diabetes and heart disease which is due to familial genetics.  She has prediabetes with last A1c of 6.1%.  She has been experiencing volatile hypertension.  She can have quite elevated blood pressure at times.  In February 2024 her blood pressure was 157/84 and today her first reading was 149/84.  She also has hyperlipidemia with her last total cholesterol of 304 with an LDL of 157.  She is using red yeast rice.  She is feeling frustrated because she cannot keep her weight down.  She is very petite and overall is done a good job of actually keeping weight down.  Her BMI is 28 today.  She comes in with the new complaint of hematuria and back pain.  She has had ashtyn colored urine for 2 or more days and it has a strange odor to it.  On January 16 she had a urinalysis done showing large amount of blood and 25-50 red blood cells but was otherwise clear.  She does have a history of kidney stones but she had been using the supplements Dada cheng and Stonebraker.  The last time she was seen at the [facility] was in 2021.  We discussed whether she might need to reestablish with them.  She brings me information from [facility] Dr. Scherer which shows she has nuclear sclerotic cataracts bilaterally and open-angle glaucoma of the eyes bilaterally.  She has bilateral dry eyes.  He is recommending dense be placed which hopefully would improve her dry eye and glaucoma.  She is currently on timolol drops.  She is also grossly behind on her preventative measures.  She is due for flu, Shingrix and pneumonia vaccines.  She has never been screened for colon cancer, breast cancer or osteoporosis.      Objective    /79   Pulse 96   Temp 98.2  F (36.8  C)   Resp 16    "Ht 1.473 m (4' 10\")   Wt 61.2 kg (135 lb)   LMP  (LMP Unknown)   SpO2 99%   BMI 28.22 kg/m    Body mass index is 28.22 kg/m .  Physical Exam   GENERAL: healthy, alert, no distress, and over weight  RESP: lungs clear to auscultation - no rales, rhonchi or wheezes  CV: regular rates and rhythm and without murmur  ABDOMEN: soft, nontender  MS: no gross musculoskeletal defects noted, no edema    Results for orders placed or performed in visit on 01/20/25   UA Macroscopic with reflex to Microscopic and Culture - Lab Collect     Status: Abnormal    Specimen: Urine, NOS   Result Value Ref Range    Color Urine Yellow Colorless, Straw, Light Yellow, Yellow    Appearance Urine Clear Clear    Glucose Urine Negative Negative mg/dL    Bilirubin Urine Negative Negative    Ketones Urine Negative Negative mg/dL    Specific Gravity Urine 1.010 1.005 - 1.030    Blood Urine Trace (A) Negative    pH Urine 5.5 5.0 - 8.0    Protein Albumin Urine Negative Negative mg/dL    Urobilinogen Urine 0.2 0.2, 1.0 E.U./dL    Nitrite Urine Negative Negative    Leukocyte Esterase Urine Negative Negative   Hemoglobin A1c     Status: Abnormal   Result Value Ref Range    Estimated Average Glucose 137 (H) <117 mg/dL    Hemoglobin A1C 6.4 (H) 0.0 - 5.6 %   Comprehensive metabolic panel (BMP + Alb, Alk Phos, ALT, AST, Total. Bili, TP)     Status: Abnormal   Result Value Ref Range    Sodium 137 135 - 145 mmol/L    Potassium 4.0 3.4 - 5.3 mmol/L    Carbon Dioxide (CO2) 22 22 - 29 mmol/L    Anion Gap 13 7 - 15 mmol/L    Urea Nitrogen 15.2 8.0 - 23.0 mg/dL    Creatinine 0.70 0.51 - 0.95 mg/dL    GFR Estimate >90 >60 mL/min/1.73m2    Calcium 9.6 8.8 - 10.4 mg/dL    Chloride 102 98 - 107 mmol/L    Glucose 157 (H) 70 - 99 mg/dL    Alkaline Phosphatase 100 40 - 150 U/L    AST 30 0 - 45 U/L    ALT 29 0 - 50 U/L    Protein Total 7.5 6.4 - 8.3 g/dL    Albumin 4.4 3.5 - 5.2 g/dL    Bilirubin Total 0.4 <=1.2 mg/dL    Patient Fasting > 8hrs? Unknown    Lipid panel " reflex to direct LDL Non-fasting     Status: Abnormal   Result Value Ref Range    Cholesterol 315 (H) <200 mg/dL    Triglycerides 454 (H) <150 mg/dL    Direct Measure HDL 84 >=50 mg/dL    LDL Cholesterol Calculated      Non HDL Cholesterol 231 (H) <130 mg/dL    Patient Fasting > 8hrs? Unknown     Narrative    Cholesterol  Desirable: < 200 mg/dL  Borderline High: 200 - 239 mg/dL  High: >= 240 mg/dL    Triglycerides  Normal: < 150 mg/dL  Borderline High: 150 - 199 mg/dL  High: 200-499 mg/dL  Very High: >= 500 mg/dL    Direct Measure HDL  Female: >= 50 mg/dL   Male: >= 40 mg/dL    LDL Cholesterol  Desirable: < 100 mg/dL  Above Desirable: 100 - 129 mg/dL   Borderline High: 130 - 159 mg/dL   High:  160 - 189 mg/dL   Very High: >= 190 mg/dL    Non HDL Cholesterol  Desirable: < 130 mg/dL  Above Desirable: 130 - 159 mg/dL  Borderline High: 160 - 189 mg/dL  High: 190 - 219 mg/dL  Very High: >= 220 mg/dL   UA Microscopic with Reflex to Culture     Status: Abnormal   Result Value Ref Range    Bacteria Urine Few (A) None Seen /HPF    RBC Urine 2-5 (A) 0-2 /HPF /HPF    WBC Urine 0-5 0-5 /HPF /HPF    Squamous Epithelials Urine Few (A) None Seen /LPF    Narrative    Urine Culture not indicated   LDL cholesterol direct     Status: Abnormal   Result Value Ref Range    LDL Cholesterol Direct 192 (H) <100 mg/dL           Signed Electronically by: Yessica Nolan MD

## 2025-01-21 ENCOUNTER — PATIENT OUTREACH (OUTPATIENT)
Dept: CARE COORDINATION | Facility: CLINIC | Age: 70
End: 2025-01-21
Payer: COMMERCIAL

## 2025-01-21 LAB
ALBUMIN SERPL BCG-MCNC: 4.4 G/DL (ref 3.5–5.2)
ALP SERPL-CCNC: 100 U/L (ref 40–150)
ALT SERPL W P-5'-P-CCNC: 29 U/L (ref 0–50)
ANION GAP SERPL CALCULATED.3IONS-SCNC: 13 MMOL/L (ref 7–15)
AST SERPL W P-5'-P-CCNC: 30 U/L (ref 0–45)
BILIRUB SERPL-MCNC: 0.4 MG/DL
BUN SERPL-MCNC: 15.2 MG/DL (ref 8–23)
CALCIUM SERPL-MCNC: 9.6 MG/DL (ref 8.8–10.4)
CHLORIDE SERPL-SCNC: 102 MMOL/L (ref 98–107)
CREAT SERPL-MCNC: 0.7 MG/DL (ref 0.51–0.95)
EGFRCR SERPLBLD CKD-EPI 2021: >90 ML/MIN/1.73M2
FASTING STATUS PATIENT QL REPORTED: ABNORMAL
GLUCOSE SERPL-MCNC: 157 MG/DL (ref 70–99)
HCO3 SERPL-SCNC: 22 MMOL/L (ref 22–29)
POTASSIUM SERPL-SCNC: 4 MMOL/L (ref 3.4–5.3)
PROT SERPL-MCNC: 7.5 G/DL (ref 6.4–8.3)
SODIUM SERPL-SCNC: 137 MMOL/L (ref 135–145)

## 2025-01-24 NOTE — TELEPHONE ENCOUNTER
Please call the patient and ask her to call her insurance to find out if there is an alternative to this medication that is covered on her formulary.  The information they sent just does not have that as far as I can tell.

## 2025-01-27 NOTE — TELEPHONE ENCOUNTER
Acyclovir capules, tablet, valacyclovir oral form is covered.   Pt can pay out of pocket using goodrx, OTC - cold sore ointment per Dr Nolan    No answer. No voicemail

## 2025-01-30 ENCOUNTER — OFFICE VISIT (OUTPATIENT)
Dept: FAMILY MEDICINE | Facility: CLINIC | Age: 70
End: 2025-01-30
Payer: COMMERCIAL

## 2025-01-30 VITALS
DIASTOLIC BLOOD PRESSURE: 98 MMHG | OXYGEN SATURATION: 97 % | HEIGHT: 58 IN | HEART RATE: 77 BPM | RESPIRATION RATE: 16 BRPM | SYSTOLIC BLOOD PRESSURE: 152 MMHG | WEIGHT: 133.5 LBS | TEMPERATURE: 98.7 F | BODY MASS INDEX: 28.02 KG/M2

## 2025-01-30 DIAGNOSIS — E78.2 MIXED HYPERLIPIDEMIA: Chronic | ICD-10-CM

## 2025-01-30 DIAGNOSIS — R73.03 PREDIABETES: Chronic | ICD-10-CM

## 2025-01-30 DIAGNOSIS — R31.9 HEMATURIA, UNSPECIFIED TYPE: ICD-10-CM

## 2025-01-30 DIAGNOSIS — I10 BENIGN ESSENTIAL HYPERTENSION: Primary | Chronic | ICD-10-CM

## 2025-01-30 LAB
ALBUMIN UR-MCNC: 30 MG/DL
APPEARANCE UR: CLEAR
BACTERIA #/AREA URNS HPF: ABNORMAL /HPF
BILIRUB UR QL STRIP: NEGATIVE
CHOLEST SERPL-MCNC: 302 MG/DL
COLOR UR AUTO: YELLOW
FASTING STATUS PATIENT QL REPORTED: YES
FASTING STATUS PATIENT QL REPORTED: YES
GLUCOSE SERPL-MCNC: 123 MG/DL (ref 70–99)
GLUCOSE UR STRIP-MCNC: NEGATIVE MG/DL
HDLC SERPL-MCNC: 86 MG/DL
HGB UR QL STRIP: ABNORMAL
KETONES UR STRIP-MCNC: NEGATIVE MG/DL
LDLC SERPL CALC-MCNC: 193 MG/DL
LEUKOCYTE ESTERASE UR QL STRIP: NEGATIVE
NITRATE UR QL: NEGATIVE
NONHDLC SERPL-MCNC: 216 MG/DL
PH UR STRIP: 6 [PH] (ref 5–8)
RBC #/AREA URNS AUTO: ABNORMAL /HPF
SP GR UR STRIP: >=1.03 (ref 1–1.03)
SQUAMOUS #/AREA URNS AUTO: ABNORMAL /LPF
TRIGL SERPL-MCNC: 116 MG/DL
UROBILINOGEN UR STRIP-ACNC: 0.2 E.U./DL
WBC #/AREA URNS AUTO: ABNORMAL /HPF

## 2025-01-30 ASSESSMENT — PAIN SCALES - GENERAL: PAINLEVEL_OUTOF10: NO PAIN (0)

## 2025-01-30 NOTE — PROGRESS NOTES
Assessment & Plan     Benign essential hypertension  Currently not taking her losartan 25 mg and I want her to start this medication and come back again for a nurse only blood pressure check in about a week or 2.  Her current blood pressure is 152/98 which is improved from the 170 systolic reading on her first blood pressure check.    Mixed hyperlipidemia  Patient was not fasting with her last labs and insists doing this with her fasting so that we have more precise lab results.  When she was not fasting her total cholesterol was 315 and her LDL was 192.  Will recheck.  - Lipid panel reflex to direct LDL Fasting  - Lipid panel reflex to direct LDL Fasting    Prediabetes  She was found to have a glucose of 157 at our last visit.  Her A1c was 6.4% which was up from 6.1 and 6.2 previously.  She has strong family history for diabetes and we discussed that as she ages this is just what is going to happen no matter what.  She had hoped she could change her medical future with good behavior and supplements, but that has not panned out.  - Glucose  - Glucose    Hematuria, unspecified type  Repeated hematuria this morning after having clear urine when taking Stonebraker.  She has known nephrolithiasis and had previously been seen at the [facility] by Dr. Blakely.  However he is no longer there.  We discussed finding a new nephrologist.  I also would like to recheck her urine.  - UA Macroscopic with reflex to Microscopic and Culture - Lab Collect  - Adult Nephrology  Referral  - UA Macroscopic with reflex to Microscopic and Culture - Lab Collect  - UA Microscopic with Reflex to Culture    I will get back to her on her lab results by MyChart or mail and only call with grossly abnormal values.  She is to come and get her blood pressure rechecked and then we will increase the medicine accordingly.  We also discussed we would be adding a statin and likely she would need rosuvastatin not simvastatin which her  "mother takes.    The longitudinal plan of care for the diagnosis(es)/condition(s) as documented were addressed during this visit. Due to the added complexity in care, I will continue to support Gala in the subsequent management and with ongoing continuity of care.      FUTURE APPOINTMENTS:       - Follow-up visit in approximately 2 months to follow-up on labs and to initiate statin.      Subjective   Gala is a 69 year old, presenting for the following health issues:  Lipids (Discuss management options- she states she was not fasting during last test. Also has concerns about starting at the same times as losartan)        1/30/2025    11:19 AM   Additional Questions   Roomed by Angelic Maldonado CMA     History of Present Illness       Reason for visit:  Discuss potiential medications   She is taking medications regularly.     I just saw this patient recently on 1/20/2025 with a complaint of gross hematuria.  That resolved for a while because she believes the stone blocker supplement she was using worked and then she backed off on it and now just this morning she had more blood in her urine.  At our last visit she also had elevated blood pressure and she was given losartan 25 mg but she picked it up and has not started it.  She wanted to start the medication over the weekend so she could have plenty of time to know if she was going to get side effects.  We discussed it would be wise to start the statin only when we know her blood pressure medication is working and not causing unwanted side effects.      Objective    BP (!) 152/98 (BP Location: Right arm, Patient Position: Sitting, Cuff Size: Adult Regular)   Pulse 77   Temp 98.7  F (37.1  C) (Oral)   Resp 16   Ht 1.473 m (4' 10\")   Wt 60.6 kg (133 lb 8 oz)   LMP  (LMP Unknown)   SpO2 97%   BMI 27.90 kg/m    Body mass index is 27.9 kg/m .  Physical Exam               Signed Electronically by: Yessica Nolan MD    "

## 2025-02-10 ENCOUNTER — ALLIED HEALTH/NURSE VISIT (OUTPATIENT)
Dept: FAMILY MEDICINE | Facility: CLINIC | Age: 70
End: 2025-02-10
Payer: COMMERCIAL

## 2025-02-10 VITALS — SYSTOLIC BLOOD PRESSURE: 162 MMHG | HEART RATE: 74 BPM | DIASTOLIC BLOOD PRESSURE: 84 MMHG

## 2025-02-10 DIAGNOSIS — I10 BENIGN ESSENTIAL HYPERTENSION: Primary | Chronic | ICD-10-CM

## 2025-02-10 PROCEDURE — 99207 PR NO CHARGE NURSE ONLY: CPT

## 2025-02-10 RX ORDER — LOSARTAN POTASSIUM 50 MG/1
50 TABLET ORAL DAILY
Qty: 30 TABLET | Refills: 0 | Status: SHIPPED | OUTPATIENT
Start: 2025-02-10

## 2025-02-10 NOTE — PROGRESS NOTES
Gala Nicholas is a 69 year old year old patient who comes in today for a Blood Pressure check because of ongoing blood pressure monitoring.    Vital Signs as repeated by /86, 162/84  Patient is taking medication as prescribed  Patient is tolerating medications well.  Current complaints: none    Disposition:  huddled with provider, patient is to double her losartan to 50 mg. The patient was informed of this and will begin taking two losartan a day to total 50 mg. New 50 mg prescription pended for provider review to the patient's preferred pharmacy.     Abdirahman Gutiérrez RN  [facility]

## 2025-02-11 ENCOUNTER — OFFICE VISIT (OUTPATIENT)
Dept: FAMILY MEDICINE | Facility: CLINIC | Age: 70
End: 2025-02-11
Payer: COMMERCIAL

## 2025-02-11 VITALS
RESPIRATION RATE: 16 BRPM | SYSTOLIC BLOOD PRESSURE: 151 MMHG | DIASTOLIC BLOOD PRESSURE: 82 MMHG | BODY MASS INDEX: 28.13 KG/M2 | HEART RATE: 76 BPM | HEIGHT: 58 IN | OXYGEN SATURATION: 98 % | WEIGHT: 134 LBS | TEMPERATURE: 98.1 F

## 2025-02-11 DIAGNOSIS — R10.9 RIGHT FLANK PAIN: ICD-10-CM

## 2025-02-11 DIAGNOSIS — N20.0 NEPHROLITHIASIS: Chronic | ICD-10-CM

## 2025-02-11 DIAGNOSIS — R31.0 GROSS HEMATURIA: ICD-10-CM

## 2025-02-11 DIAGNOSIS — R73.03 PREDIABETES: Chronic | ICD-10-CM

## 2025-02-11 DIAGNOSIS — I10 BENIGN ESSENTIAL HYPERTENSION: Primary | Chronic | ICD-10-CM

## 2025-02-11 DIAGNOSIS — E78.2 MIXED HYPERLIPIDEMIA: Chronic | ICD-10-CM

## 2025-02-11 PROCEDURE — G2211 COMPLEX E/M VISIT ADD ON: HCPCS | Performed by: FAMILY MEDICINE

## 2025-02-11 PROCEDURE — 99215 OFFICE O/P EST HI 40 MIN: CPT | Performed by: FAMILY MEDICINE

## 2025-02-11 RX ORDER — ROSUVASTATIN CALCIUM 5 MG/1
5 TABLET, COATED ORAL DAILY
Qty: 30 TABLET | Refills: 2 | Status: SHIPPED | OUTPATIENT
Start: 2025-02-11

## 2025-02-11 ASSESSMENT — PAIN SCALES - GENERAL: PAINLEVEL_OUTOF10: MODERATE PAIN (4)

## 2025-02-11 NOTE — PROGRESS NOTES
"  Assessment & Plan     Benign essential hypertension  ***    Mixed hyperlipidemia  ***  - rosuvastatin (CRESTOR) 5 MG tablet  Dispense: 30 tablet; Refill: 2  - Lipid panel reflex to direct LDL Fasting    Prediabetes  ***    Nephrolithiasis  ***  - Adult Urology  Referral  - CT Abdomen Pelvis w/o Contrast    Right flank pain  ***  - Adult Urology  Referral  - CT Abdomen Pelvis w/o Contrast    Gross hematuria  ***  - Adult Urology  Referral  - CT Abdomen Pelvis w/o Contrast              FUTURE APPOINTMENTS:       - Follow-up visit in ***    Ricardo Cedeño is a 69 year old, presenting for the following health issues:  RECHECK (Follow up medication. Was Increased losartan 50 mg yesterday BP this morning 141/79 p 77 ) and Referral (Urology, pain in lower back right side, concerned of kidney stone )      2/11/2025    11:09 AM   Additional Questions   Roomed by moon salinas cma   Accompanied by      History of Present Illness       Reason for visit:  Discuss potiential medications   She is taking medications regularly.       {MA/LPN/RN Pre-Provider Visit Orders- hCG/UA/Strep (Optional):559469}  {SUPERLIST (Optional):797370}  {additonal problems for provider to add (Optional):262447}    {ROS Picklists (Optional):792541}      Objective    BP (!) 151/82   Pulse 76   Temp 98.1  F (36.7  C)   Resp 16   Ht 1.473 m (4' 10\")   Wt 60.8 kg (134 lb)   LMP  (LMP Unknown)   SpO2 98%   BMI 28.01 kg/m    Body mass index is 28.01 kg/m .  Physical Exam   {Exam List (Optional):353707}    {Diagnostic Test Results (Optional):937342}        Signed Electronically by: Yessica Nolan MD  {Email feedback regarding this note to [email or web address]   :871895}  " today doing chart review, history and exam, documentation and further activities per the note      FUTURE APPOINTMENTS:       - Follow-up visit in lab in 6 to 8 weeks, blood pressure recheck in 2 to 4 weeks, and return to clinic to see me in July at the latest.      Ricardo Cedeño is a 69 year old, presenting for the following health issues:  RECHECK (Follow up medication. Was Increased losartan 50 mg yesterday BP this morning 141/79 p 77 ) and Referral (Urology, pain in lower back right side, concerned of kidney stone )        2/11/2025    11:09 AM   Additional Questions   Roomed by moon salinas cma   Accompanied by      History of Present Illness       Reason for visit:  Discuss potiential medications   She is taking medications regularly.  I last saw this patient on 1/30 and at that time we were following up on her blood pressure, cholesterol and diabetes labs.  She had not been previously fasting for labs and wanted labs to be done and so they would done at that time.  She had been started on losartan 25 mg but was nearly at goal and so at a nurse only blood pressure check she was increased to 50 mg.  This was not long ago.  Now today she is still very elevated for blood pressure at 151/82.  As I suspected her cholesterol labs did not really improve total cholesterol wise or LDL wise.  She is not ready to start a statin at this point.  She is very weary of Western medicine.  We discussed that there is only so much she can do to change these things as she eats pretty well and keeps her weight down is much as possible.  She has a very active job.  She just has very strong family history for high cholesterol, hypertension and diabetes.  We discussed our next goal is to get low-dose rosuvastatin on board.  We also discussed her hematuria.  She has a history of kidney stones and the last time she was bothered was quite a while ago, but it seems that this is what is happening again.  Her last UA had large amount  "of blood and 30 of protein and was otherwise normal.  She is now having right flank pain and intermittent hematuria.  Her urine is frequently a ashtyn red-brown color.  She used to go to the [facility] to see Dr. Blakely but he is no longer there.  She will need to establish with a new nephrologist/urologist.        Objective    BP (!) 151/82   Pulse 76   Temp 98.1  F (36.7  C)   Resp 16   Ht 1.473 m (4' 10\")   Wt 60.8 kg (134 lb)   LMP  (LMP Unknown)   SpO2 98%   BMI 28.01 kg/m    Body mass index is 28.01 kg/m .  Physical Exam   GENERAL: healthy, alert, no distress, and over weight  RESP: lungs clear to auscultation - no rales, rhonchi or wheezes  CV: regular rates and rhythm and murmur  ABDOMEN: soft, nontender  MS: Trace edema to ankle  PSYCH: mentation appears normal and anxious    No results found for any visits on 02/11/25.        Signed Electronically by: Yessica Nolan MD    "

## 2025-02-12 ENCOUNTER — HOSPITAL ENCOUNTER (EMERGENCY)
Facility: CLINIC | Age: 70
Discharge: LEFT BEFORE DISCHARGE | End: 2025-02-12
Admitting: EMERGENCY MEDICINE
Payer: COMMERCIAL

## 2025-02-12 VITALS
DIASTOLIC BLOOD PRESSURE: 87 MMHG | OXYGEN SATURATION: 96 % | SYSTOLIC BLOOD PRESSURE: 169 MMHG | WEIGHT: 134 LBS | BODY MASS INDEX: 28.01 KG/M2 | TEMPERATURE: 97.6 F | HEART RATE: 93 BPM | RESPIRATION RATE: 20 BRPM

## 2025-02-12 PROCEDURE — 99281 EMR DPT VST MAYX REQ PHY/QHP: CPT | Performed by: EMERGENCY MEDICINE

## 2025-02-12 ASSESSMENT — COLUMBIA-SUICIDE SEVERITY RATING SCALE - C-SSRS
6. HAVE YOU EVER DONE ANYTHING, STARTED TO DO ANYTHING, OR PREPARED TO DO ANYTHING TO END YOUR LIFE?: NO
1. IN THE PAST MONTH, HAVE YOU WISHED YOU WERE DEAD OR WISHED YOU COULD GO TO SLEEP AND NOT WAKE UP?: NO
2. HAVE YOU ACTUALLY HAD ANY THOUGHTS OF KILLING YOURSELF IN THE PAST MONTH?: NO

## 2025-02-12 NOTE — ED TRIAGE NOTES
Pt here with wanting to rule a kidney stone. She has had right flank pain with hematuria. Hx of stones in the past.      Triage Assessment (Adult)       Row Name 02/12/25 1364          Triage Assessment    Airway WDL WDL        Respiratory WDL    Respiratory WDL WDL        Skin Circulation/Temperature WDL    Skin Circulation/Temperature WDL WDL        Cardiac WDL    Cardiac WDL WDL        Peripheral/Neurovascular WDL    Peripheral Neurovascular WDL WDL        Cognitive/Neuro/Behavioral WDL    Cognitive/Neuro/Behavioral WDL WDL

## 2025-02-13 ENCOUNTER — PATIENT OUTREACH (OUTPATIENT)
Dept: FAMILY MEDICINE | Facility: CLINIC | Age: 70
End: 2025-02-13
Payer: COMMERCIAL

## 2025-02-13 NOTE — TELEPHONE ENCOUNTER
Patient left without being seen. Signing encounter.    Abdirahman Gutiérrez RN  [facility]

## 2025-03-08 ENCOUNTER — OFFICE VISIT (OUTPATIENT)
Dept: URGENT CARE | Facility: URGENT CARE | Age: 70
End: 2025-03-08
Payer: COMMERCIAL

## 2025-03-08 VITALS
OXYGEN SATURATION: 94 % | TEMPERATURE: 99.9 F | HEART RATE: 120 BPM | BODY MASS INDEX: 27.38 KG/M2 | RESPIRATION RATE: 30 BRPM | SYSTOLIC BLOOD PRESSURE: 141 MMHG | DIASTOLIC BLOOD PRESSURE: 77 MMHG | WEIGHT: 131 LBS

## 2025-03-08 DIAGNOSIS — J22 LOWER RESPIRATORY TRACT INFECTION: Primary | ICD-10-CM

## 2025-03-08 PROCEDURE — 3078F DIAST BP <80 MM HG: CPT

## 2025-03-08 PROCEDURE — 99213 OFFICE O/P EST LOW 20 MIN: CPT

## 2025-03-08 PROCEDURE — 3077F SYST BP >= 140 MM HG: CPT

## 2025-03-08 RX ORDER — AZITHROMYCIN 250 MG/1
TABLET, FILM COATED ORAL
Qty: 6 TABLET | Refills: 0 | Status: SHIPPED | OUTPATIENT
Start: 2025-03-08 | End: 2025-03-13

## 2025-03-08 RX ORDER — GUAIFENESIN/DEXTROMETHORPHAN 100-10MG/5
5-10 SYRUP ORAL EVERY 4 HOURS PRN
Qty: 240 ML | Refills: 0 | Status: SHIPPED | OUTPATIENT
Start: 2025-03-08

## 2025-03-08 NOTE — PROGRESS NOTES
Assessment & Plan     Lower respiratory tract infection  Likely viral bronchitis at this point.  Recommended symptomatic management and viral testing today, though patient respectfully declines.  She will be given a prescription for a Z-Shakir that she agrees to fill only if symptoms are worsening (increased mucopurulent sputum, fevers, shortness of breath, etc.)  - azithromycin (ZITHROMAX) 250 MG tablet  Dispense: 6 tablet; Refill: 0  - guaiFENesin-dextromethorphan (ROBITUSSIN DM) 100-10 MG/5ML syrup  Dispense: 240 mL; Refill: 0             No follow-ups on file.    [facility]    Ricardo Cedeño is a 69 year old female who presents to clinic today for the following health issues:  Chief Complaint   Patient presents with    Breathing Problem     Symptoms started Friday. Exposed to bronchitis from a co-worker. SOB, some laryngitis, burning tightness in chest, croupy cough. HX of bronchitis.          3/8/2025     4:10 PM   Additional Questions   Roomed by Xena JACKSON   Accompanied by  in [facility]  69-year-old with a history of hypertension and recurrent severe bacterial lower respiratory infections who presents with several days of fever cough shortness of breath chest discomfort.  It has been a while since she has had this kind of constellation of symptoms.  She was exposed to someone at work who had some similar symptoms but is unclear what the etiology of that was.  Declines influenza or COVID testing today.    URI Adult      Course of illness is worsening.    Severity moderately severe  Current and Associated symptoms: fever, sweats, stuffy nose, cough - non-productive, and sore throat      Review of Systems  Constitutional, HEENT, cardiovascular, pulmonary, gi and gu systems are negative, except as otherwise noted.      Objective    BP (!) 141/77   Pulse 120   Temp 99.9  F (37.7  C) (Oral)   Resp 30   Wt 59.4 kg (131 lb)   LMP  (LMP Unknown)   SpO2 94%    BMI 27.38 kg/m    Physical Exam  Constitutional:       Appearance: She is ill-appearing.   HENT:      Head: Normocephalic.      Right Ear: Tympanic membrane normal.      Left Ear: Tympanic membrane normal.      Nose: Nose normal.      Mouth/Throat:      Mouth: Mucous membranes are moist.      Pharynx: Oropharynx is clear.   Cardiovascular:      Rate and Rhythm: Normal rate.   Pulmonary:      Effort: Pulmonary effort is normal.      Breath sounds: Normal breath sounds.   Musculoskeletal:      Cervical back: Normal range of motion.   Neurological:      General: No focal deficit present.      Mental Status: She is alert.

## 2025-03-09 ENCOUNTER — OFFICE VISIT (OUTPATIENT)
Dept: URGENT CARE | Facility: URGENT CARE | Age: 70
End: 2025-03-09
Payer: COMMERCIAL

## 2025-03-09 VITALS
BODY MASS INDEX: 27.38 KG/M2 | WEIGHT: 131 LBS | HEART RATE: 94 BPM | TEMPERATURE: 98.6 F | DIASTOLIC BLOOD PRESSURE: 90 MMHG | SYSTOLIC BLOOD PRESSURE: 155 MMHG

## 2025-03-09 DIAGNOSIS — U07.1 INFECTION DUE TO 2019 NOVEL CORONAVIRUS: Primary | ICD-10-CM

## 2025-03-09 LAB
FLUAV AG SPEC QL IA: NEGATIVE
FLUBV AG SPEC QL IA: NEGATIVE

## 2025-03-09 PROCEDURE — 3080F DIAST BP >= 90 MM HG: CPT | Performed by: PREVENTIVE MEDICINE

## 2025-03-09 PROCEDURE — 99214 OFFICE O/P EST MOD 30 MIN: CPT | Performed by: PREVENTIVE MEDICINE

## 2025-03-09 PROCEDURE — 87804 INFLUENZA ASSAY W/OPTIC: CPT | Performed by: PREVENTIVE MEDICINE

## 2025-03-09 PROCEDURE — 3075F SYST BP GE 130 - 139MM HG: CPT | Performed by: PREVENTIVE MEDICINE

## 2025-03-09 RX ORDER — CODEINE PHOSPHATE AND GUAIFENESIN 10; 100 MG/5ML; MG/5ML
1 SOLUTION ORAL EVERY 6 HOURS PRN
Qty: 118 ML | Refills: 0 | Status: SHIPPED | OUTPATIENT
Start: 2025-03-09

## 2025-03-09 NOTE — LETTER
3/9/2025    Gala Nicholas   1955        To Whom it May Concern;    Please excuse Gala Nicholas from work until 2025 due to illness.    Sincerely,        Joe Wilkinson MD

## 2025-03-09 NOTE — PROGRESS NOTES
Assessment & Plan     1. Infection due to 2019 novel coronavirus (Primary)  Hold crestor while taking paxlovid  Paxlovid for 5 days  Tylenol  Fluids, rest  Robitussin ac for cough as well.    Follow up in 10-14 days for recheck or sooner as needed           35 minutes spent by me on the date of the encounter doing chart review, history and exam, documentation and further activities per the note        No follow-ups on file.    Joe Wilkinson MD  [facility]    Subjective     Gala Nicholas is a 69 year old year old female who presents to clinic today for the following health issues:    Patient presents with:  Cough  Covid Concern: Positive covid    This is a 70 yo female who has had congestion and cough for 2 days.  Tested positive for covid at home this morning.  No sob, cp, fever, ear pain, facial pain, rash.    Patient Active Problem List   Diagnosis    Hand arthritis    Hyperlipidemia    Murmur, cardiac    Genital warts    Herpes simplex    Postcoital urinary tract infection    Impaired fasting glucose    Glaucoma    Prediabetes    Nephrolithiasis    Benign essential hypertension       Current Outpatient Medications   Medication Sig Dispense Refill    acyclovir (ZOVIRAX) 5 % external cream Apply topically 5 x daily PRN (cold sores). 5 g 1    aspirin (ASA) 81 MG EC tablet Take 81 mg by mouth      azithromycin (ZITHROMAX) 250 MG tablet Take 2 tablets (500 mg) by mouth daily for 1 day, THEN 1 tablet (250 mg) daily for 4 days. 6 tablet 0    ESTRADIOL 0.1MG/GM CREAM Insert 1 gram vaginally 2-3 times per week 30 g 1    guaiFENesin-codeine (ROBITUSSIN AC) 100-10 MG/5ML solution Take 5 mLs by mouth every 6 hours as needed for cough. 118 mL 0    losartan (COZAAR) 50 MG tablet Take 1 tablet (50 mg) by mouth daily. 30 tablet 0    nirmatrelvir and ritonavir (PAXLOVID) 300 mg/100 mg therapy pack Take 3 tablets by mouth 2 times daily for 5 days. 30 tablet 0    nitroFURantoin macrocrystal  (MACRODANTIN) 50 MG capsule Take 1 capsule (50 mg) by mouth 4 times daily. 30 capsule 1    Omega-3 1000 MG capsule Take 2 g by mouth      podofilox (CONDYLOX) 0.5 % external gel Apply topically 2 times daily.      rosuvastatin (CRESTOR) 5 MG tablet Take 1 tablet (5 mg) by mouth daily. 30 tablet 2    timolol maleate (TIMOPTIC) 0.5 % ophthalmic solution 1 drop      guaiFENesin-dextromethorphan (ROBITUSSIN DM) 100-10 MG/5ML syrup Take 5-10 mLs by mouth every 4 hours as needed for cough. (Patient not taking: Reported on 3/9/2025) 240 mL 0    valACYclovir (VALTREX) 500 MG tablet Take 1 tablet (500 mg) by mouth 2 times daily for 3 days. (Patient not taking: Reported on 1/30/2025) 6 tablet 1     No current facility-administered medications for this visit.       Past Medical History:   Diagnosis Date    H/O colposcopy with cervical biopsy     Post-menopausal        Social History   reports that she quit smoking about 40 years ago. Her smoking use included cigarettes. She has never been exposed to tobacco smoke. She has never used smokeless tobacco. She reports current alcohol use of about 0.8 standard drinks of alcohol per week. She reports that she does not use drugs.    Family History   Problem Relation Age of Onset    Diabetes Mother     Hypertension Father     Cancer Father     Heart Disease Paternal Grandfather     Atrial fibrillation Paternal Grandfather     Cerebrovascular Disease Paternal Grandmother     Atrial fibrillation Brother     Hypertension Maternal Grandmother        Review of Systems  Constitutional, HEENT, cardiovascular, pulmonary, GI, , musculoskeletal, neuro, skin, endocrine and psych systems are negative, except as otherwise noted.      Objective    BP (!) 155/90 (BP Location: Right arm, Patient Position: Sitting, Cuff Size: Adult Regular)   Pulse 94   Temp 98.6  F (37  C) (Oral)   Wt 59.4 kg (131 lb)   LMP  (LMP Unknown)   BMI 27.38 kg/m    Physical Exam   GENERAL: alert and no distress  EYES:  Eyes grossly normal to inspection, PERRL and conjunctivae and sclerae normal  HENT: ear canals and TM's normal, nose and mouth without ulcers or lesions  NECK: no adenopathy, no asymmetry, masses, or scars  RESP: lungs clear to auscultation - no rales, rhonchi or wheezes  CV: regular rate and rhythm, normal S1 S2, no S3 or S4, no murmur, click or rub, no peripheral edema  ABDOMEN: soft, nontender, no hepatosplenomegaly, no masses and bowel sounds normal  MS: no gross musculoskeletal defects noted, no edema  SKIN: no suspicious lesions or rashes  NEURO: Normal strength and tone, mentation intact and speech normal  PSYCH: mentation appears normal, affect normal/bright    Results for orders placed or performed in visit on 03/09/25   Influenza A & B Antigen - Clinic Collect     Status: Normal    Specimen: Nose; Swab   Result Value Ref Range    Influenza A antigen Negative Negative    Influenza B antigen Negative Negative    Narrative    Test results must be correlated with clinical data. If necessary, results should be confirmed by a molecular assay or viral culture.

## 2025-03-17 DIAGNOSIS — I10 BENIGN ESSENTIAL HYPERTENSION: Chronic | ICD-10-CM

## 2025-03-17 RX ORDER — LOSARTAN POTASSIUM 50 MG/1
50 TABLET ORAL DAILY
Qty: 30 TABLET | Refills: 0 | Status: SHIPPED | OUTPATIENT
Start: 2025-03-17

## 2025-03-17 NOTE — TELEPHONE ENCOUNTER
Patient said she had to cancel her lab appointment today because she has Covid and received Paxlovid.  Patient said she will reschedule the appointment when feeling better.

## 2025-04-06 ENCOUNTER — HEALTH MAINTENANCE LETTER (OUTPATIENT)
Age: 70
End: 2025-04-06

## 2025-04-16 ENCOUNTER — PATIENT OUTREACH (OUTPATIENT)
Dept: CARE COORDINATION | Facility: CLINIC | Age: 70
End: 2025-04-16
Payer: COMMERCIAL

## 2025-04-24 ENCOUNTER — OFFICE VISIT (OUTPATIENT)
Dept: FAMILY MEDICINE | Facility: CLINIC | Age: 70
End: 2025-04-24
Payer: COMMERCIAL

## 2025-04-24 ENCOUNTER — TELEPHONE (OUTPATIENT)
Dept: FAMILY MEDICINE | Facility: CLINIC | Age: 70
End: 2025-04-24

## 2025-04-24 VITALS
DIASTOLIC BLOOD PRESSURE: 79 MMHG | BODY MASS INDEX: 28.61 KG/M2 | WEIGHT: 136.3 LBS | RESPIRATION RATE: 14 BRPM | HEIGHT: 58 IN | HEART RATE: 93 BPM | SYSTOLIC BLOOD PRESSURE: 137 MMHG | TEMPERATURE: 98.6 F | OXYGEN SATURATION: 97 %

## 2025-04-24 DIAGNOSIS — E78.2 MIXED HYPERLIPIDEMIA: Chronic | ICD-10-CM

## 2025-04-24 DIAGNOSIS — R73.03 PREDIABETES: Chronic | ICD-10-CM

## 2025-04-24 DIAGNOSIS — R06.2 WHEEZE: ICD-10-CM

## 2025-04-24 DIAGNOSIS — I10 BENIGN ESSENTIAL HYPERTENSION: Primary | Chronic | ICD-10-CM

## 2025-04-24 PROCEDURE — 3078F DIAST BP <80 MM HG: CPT | Performed by: FAMILY MEDICINE

## 2025-04-24 PROCEDURE — 1126F AMNT PAIN NOTED NONE PRSNT: CPT | Performed by: FAMILY MEDICINE

## 2025-04-24 PROCEDURE — G2211 COMPLEX E/M VISIT ADD ON: HCPCS | Performed by: FAMILY MEDICINE

## 2025-04-24 PROCEDURE — 99215 OFFICE O/P EST HI 40 MIN: CPT | Performed by: FAMILY MEDICINE

## 2025-04-24 PROCEDURE — 3075F SYST BP GE 130 - 139MM HG: CPT | Performed by: FAMILY MEDICINE

## 2025-04-24 RX ORDER — LOSARTAN POTASSIUM 50 MG/1
50 TABLET ORAL DAILY
Qty: 90 TABLET | Refills: 1 | Status: SHIPPED | OUTPATIENT
Start: 2025-04-24

## 2025-04-24 RX ORDER — LOSARTAN POTASSIUM 50 MG/1
50 TABLET ORAL DAILY
Qty: 30 TABLET | Refills: 0 | Status: SHIPPED | OUTPATIENT
Start: 2025-04-24 | End: 2025-04-24

## 2025-04-24 RX ORDER — ROSUVASTATIN CALCIUM 5 MG/1
5 TABLET, COATED ORAL DAILY
Qty: 90 TABLET | Refills: 1 | Status: SHIPPED | OUTPATIENT
Start: 2025-04-24

## 2025-04-24 RX ORDER — ALBUTEROL SULFATE 90 UG/1
1-2 INHALANT RESPIRATORY (INHALATION) EVERY 6 HOURS PRN
Qty: 18 G | Refills: 2 | Status: SHIPPED | OUTPATIENT
Start: 2025-04-24

## 2025-04-24 ASSESSMENT — PAIN SCALES - GENERAL: PAINLEVEL_OUTOF10: NO PAIN (0)

## 2025-04-24 NOTE — PROGRESS NOTES
"  Assessment & Plan     Benign essential hypertension  ***  - Comprehensive metabolic panel (BMP + Alb, Alk Phos, ALT, AST, Total. Bili, TP)  - losartan (COZAAR) 50 MG tablet  Dispense: 90 tablet; Refill: 1    Mixed hyperlipidemia  ***  - Lipid panel reflex to direct LDL Fasting  - CK total  - rosuvastatin (CRESTOR) 5 MG tablet  Dispense: 90 tablet; Refill: 1    Prediabetes  ***              {Follow-up (Optional):374980}    Ricardo Cedeño is a 69 year old, presenting for the following health issues:  Hypertension (Follow up. Medication refill due. )        4/24/2025     2:57 PM   Additional Questions   Roomed by Linda DORSEY LPN     History of Present Illness       Hypertension: She presents for follow up of hypertension.  She does not check blood pressure  regularly outside of the clinic. Outpatient blood pressures have not been over 140/90. She does not follow a low salt diet.          {MA/LPN/RN Pre-Provider Visit Orders- hCG/UA/Strep (Optional):563060}  {SUPERLIST (Optional):521369}  {additonal problems for provider to add (Optional):534614}    {ROS Picklists (Optional):775657}      Objective    /79 (BP Location: Right arm, Patient Position: Sitting, Cuff Size: Adult Regular)   Pulse 93   Temp 98.6  F (37  C) (Oral)   Resp 14   Ht 1.473 m (4' 10\")   Wt 61.8 kg (136 lb 4.8 oz)   LMP  (LMP Unknown)   SpO2 97%   BMI 28.49 kg/m    Body mass index is 28.49 kg/m .  Physical Exam   {Exam List (Optional):780164}    {Diagnostic Test Results (Optional):101602}        Signed Electronically by: Yessica Nolan MD  {Email feedback regarding this note to [email or web address]   :617008}  " "    History of Present Illness       Hypertension: She presents for follow up of hypertension.  She does not check blood pressure  regularly outside of the clinic. Outpatient blood pressures have not been over 140/90. She does not follow a low salt diet.        I last saw this patient on 1/20/2025.  She is someone who does not really like Western medicine.  She is [place].  She likes to treat her health with herbal remedies.  However, since she has gone through menopause and is getting older her blood pressure has gotten elevated.  She has not been able to drop it with better diet or supplements.  Her last blood pressure was 155/90.  I have started her on losartan then now have moved up to 50 mg.  She says she is tolerating this dosing.  She also has very high cholesterol numbers.  On her dad side there is very high cholesterol.  Her total cholesterol at our last visit was 302 with an LDL of 193.  I have put her on 5 mg of rosuvastatin.  We have talked about moving up if need be.  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  She is also going to need to have cataract surgery.  She has been running around to a lot of different appointments.  She says she has not gotten her preventative measures straighten out yet but promises to do so over the summer.      Objective    /79 (BP Location: Right arm, Patient Position: Sitting, Cuff Size: Adult Regular)   Pulse 93   Temp 98.6  F (37  C) (Oral)   Resp 14   Ht 1.473 m (4' 10\")   Wt 61.8 kg (136 lb 4.8 oz)   LMP  (LMP Unknown)   SpO2 97%   BMI 28.49 kg/m    Body mass index is 28.49 kg/m .  Physical Exam   GENERAL: healthy, alert, and no distress  RESP: lungs clear to auscultation - no rales, rhonchi or wheezes  CV: regular rates and rhythm and without murmur  ABDOMEN: soft, nontender  MS: no gross musculoskeletal defects noted, no edema          Signed Electronically by: Yessica Nolan MD    "

## 2025-04-24 NOTE — TELEPHONE ENCOUNTER
Patient Quality Outreach    Patient is due for the following:   Colon Cancer Screening  Breast Cancer Screening - Mammogram  Physical Annual Wellness Visit    Action(s) Taken:   No follow up needed at this time. Pt just had appt today for med check    Type of outreach:    Chart review performed, no outreach needed.    Questions for provider review:    None         Argelia Mercer CMA  Chart routed to None.

## 2025-04-29 ENCOUNTER — PATIENT OUTREACH (OUTPATIENT)
Dept: CARE COORDINATION | Facility: CLINIC | Age: 70
End: 2025-04-29
Payer: COMMERCIAL

## 2025-05-05 ENCOUNTER — LAB (OUTPATIENT)
Dept: LAB | Facility: CLINIC | Age: 70
End: 2025-05-05
Payer: COMMERCIAL

## 2025-05-05 DIAGNOSIS — I10 BENIGN ESSENTIAL HYPERTENSION: Chronic | ICD-10-CM

## 2025-05-05 DIAGNOSIS — E78.2 MIXED HYPERLIPIDEMIA: Chronic | ICD-10-CM

## 2025-05-05 LAB
ALBUMIN SERPL BCG-MCNC: 4.5 G/DL (ref 3.5–5.2)
ALP SERPL-CCNC: 85 U/L (ref 40–150)
ALT SERPL W P-5'-P-CCNC: 23 U/L (ref 0–50)
ANION GAP SERPL CALCULATED.3IONS-SCNC: 10 MMOL/L (ref 7–15)
AST SERPL W P-5'-P-CCNC: 25 U/L (ref 0–45)
BILIRUB SERPL-MCNC: 0.5 MG/DL
BUN SERPL-MCNC: 23.2 MG/DL (ref 8–23)
CALCIUM SERPL-MCNC: 9.6 MG/DL (ref 8.8–10.4)
CHLORIDE SERPL-SCNC: 106 MMOL/L (ref 98–107)
CHOLEST SERPL-MCNC: 202 MG/DL
CK SERPL-CCNC: 84 U/L (ref 26–192)
CREAT SERPL-MCNC: 0.98 MG/DL (ref 0.51–0.95)
EGFRCR SERPLBLD CKD-EPI 2021: 63 ML/MIN/1.73M2
FASTING STATUS PATIENT QL REPORTED: YES
FASTING STATUS PATIENT QL REPORTED: YES
GLUCOSE SERPL-MCNC: 126 MG/DL (ref 70–99)
HCO3 SERPL-SCNC: 25 MMOL/L (ref 22–29)
HDLC SERPL-MCNC: 82 MG/DL
LDLC SERPL CALC-MCNC: 89 MG/DL
NONHDLC SERPL-MCNC: 120 MG/DL
POTASSIUM SERPL-SCNC: 4.1 MMOL/L (ref 3.4–5.3)
PROT SERPL-MCNC: 7.6 G/DL (ref 6.4–8.3)
SODIUM SERPL-SCNC: 141 MMOL/L (ref 135–145)
TRIGL SERPL-MCNC: 154 MG/DL

## 2025-05-05 PROCEDURE — 82550 ASSAY OF CK (CPK): CPT

## 2025-05-05 PROCEDURE — 36415 COLL VENOUS BLD VENIPUNCTURE: CPT

## 2025-05-05 PROCEDURE — 80061 LIPID PANEL: CPT

## 2025-05-05 PROCEDURE — 80053 COMPREHEN METABOLIC PANEL: CPT

## 2025-05-06 ENCOUNTER — TELEPHONE (OUTPATIENT)
Dept: FAMILY MEDICINE | Facility: CLINIC | Age: 70
End: 2025-05-06
Payer: COMMERCIAL

## 2025-05-06 NOTE — TELEPHONE ENCOUNTER
Spoke to patient and relayed results and provider messages for each lab result drawn on 5/5/25. Patient verbalized understanding and is in agreement with plan.     Patient requested lab results be mailed. Lab results letter created, but not mailed as letter had already been sent earlier by RENAN Hung RN  [facility]

## 2025-05-06 NOTE — TELEPHONE ENCOUNTER
----- Message from Yessica Nolan sent at 5/6/2025  3:40 PM CDT -----  Please call the patient and go over her lab results which are all very good.  Thank you.

## 2025-07-15 DIAGNOSIS — R06.2 WHEEZE: ICD-10-CM

## 2025-07-15 RX ORDER — ALBUTEROL SULFATE 90 UG/1
1-2 INHALANT RESPIRATORY (INHALATION) EVERY 6 HOURS PRN
Qty: 18 G | Refills: 2 | Status: SHIPPED | OUTPATIENT
Start: 2025-07-15